# Patient Record
Sex: FEMALE | Race: BLACK OR AFRICAN AMERICAN | Employment: OTHER | URBAN - METROPOLITAN AREA
[De-identification: names, ages, dates, MRNs, and addresses within clinical notes are randomized per-mention and may not be internally consistent; named-entity substitution may affect disease eponyms.]

---

## 2018-11-26 ENCOUNTER — HOSPITAL ENCOUNTER (OUTPATIENT)
Dept: GENERAL RADIOLOGY | Age: 73
Discharge: HOME | End: 2018-11-28
Payer: COMMERCIAL

## 2018-11-26 DIAGNOSIS — Z13.9 VISIT FOR SCREENING: ICD-10-CM

## 2018-11-26 PROCEDURE — 77063 BREAST TOMOSYNTHESIS BI: CPT

## 2019-05-23 RX ORDER — ACETAMINOPHEN 325 MG/1
650 TABLET ORAL DAILY
COMMUNITY
Start: 2018-06-22 | End: 2020-08-27

## 2019-05-23 RX ORDER — ACETAMINOPHEN AND CODEINE PHOSPHATE 300; 30 MG/1; MG/1
1 TABLET ORAL EVERY 4 HOURS PRN
COMMUNITY
End: 2019-06-26 | Stop reason: ALTCHOICE

## 2019-05-24 ENCOUNTER — NURSE ONLY (OUTPATIENT)
Dept: PRIMARY CARE CLINIC | Age: 74
End: 2019-05-24
Payer: COMMERCIAL

## 2019-05-24 VITALS
TEMPERATURE: 97.3 F | OXYGEN SATURATION: 95 % | SYSTOLIC BLOOD PRESSURE: 148 MMHG | BODY MASS INDEX: 32.62 KG/M2 | HEIGHT: 67 IN | RESPIRATION RATE: 18 BRPM | WEIGHT: 207.8 LBS | HEART RATE: 74 BPM | DIASTOLIC BLOOD PRESSURE: 72 MMHG

## 2019-05-24 VITALS
HEIGHT: 67 IN | HEART RATE: 59 BPM | WEIGHT: 205 LBS | DIASTOLIC BLOOD PRESSURE: 62 MMHG | SYSTOLIC BLOOD PRESSURE: 134 MMHG | BODY MASS INDEX: 32.18 KG/M2 | OXYGEN SATURATION: 94 % | RESPIRATION RATE: 22 BRPM

## 2019-05-24 DIAGNOSIS — N18.30 CKD (CHRONIC KIDNEY DISEASE) STAGE 3, GFR 30-59 ML/MIN (HCC): Chronic | ICD-10-CM

## 2019-05-24 DIAGNOSIS — G47.00 INSOMNIA, UNSPECIFIED TYPE: ICD-10-CM

## 2019-05-24 DIAGNOSIS — N18.30 CONTROLLED TYPE 2 DIABETES MELLITUS WITH STAGE 3 CHRONIC KIDNEY DISEASE, WITHOUT LONG-TERM CURRENT USE OF INSULIN (HCC): ICD-10-CM

## 2019-05-24 DIAGNOSIS — E11.22 CONTROLLED TYPE 2 DIABETES MELLITUS WITH STAGE 3 CHRONIC KIDNEY DISEASE, WITHOUT LONG-TERM CURRENT USE OF INSULIN (HCC): ICD-10-CM

## 2019-05-24 DIAGNOSIS — Z91.81 PERSONAL HISTORY OF FALL: ICD-10-CM

## 2019-05-24 DIAGNOSIS — M15.9 GENERALIZED OSTEOARTHROSIS, INVOLVING MULTIPLE SITES: ICD-10-CM

## 2019-05-24 DIAGNOSIS — F01.50 VASCULAR DEMENTIA, UNCOMPLICATED (HCC): ICD-10-CM

## 2019-05-24 DIAGNOSIS — J44.9 OBSTRUCTIVE CHRONIC BRONCHITIS WITHOUT EXACERBATION (HCC): ICD-10-CM

## 2019-05-24 DIAGNOSIS — I10 ESSENTIAL HYPERTENSION: Primary | Chronic | ICD-10-CM

## 2019-05-24 DIAGNOSIS — R26.2 DIFFICULTY WALKING: ICD-10-CM

## 2019-05-24 DIAGNOSIS — K59.09 OTHER CONSTIPATION: ICD-10-CM

## 2019-05-24 RX ORDER — POLYETHYLENE GLYCOL 3350 17 G/17G
17 POWDER, FOR SOLUTION ORAL DAILY PRN
COMMUNITY
Start: 2015-08-15

## 2019-05-24 RX ORDER — POLYETHYLENE GLYCOL 400 AND PROPYLENE GLYCOL 4; 3 MG/ML; MG/ML
1 SOLUTION/ DROPS OPHTHALMIC 2 TIMES DAILY
Refills: 11 | COMMUNITY
Start: 2019-04-25 | End: 2020-06-01

## 2019-05-24 RX ORDER — GLIMEPIRIDE 1 MG/1
1 TABLET ORAL
COMMUNITY
End: 2019-06-20 | Stop reason: SDUPTHER

## 2019-05-24 RX ORDER — TRAZODONE HYDROCHLORIDE 50 MG/1
50 TABLET ORAL NIGHTLY
COMMUNITY
End: 2019-12-05 | Stop reason: SINTOL

## 2019-05-24 RX ORDER — LISINOPRIL AND HYDROCHLOROTHIAZIDE 20; 12.5 MG/1; MG/1
1 TABLET ORAL DAILY
COMMUNITY
End: 2019-06-20 | Stop reason: SDUPTHER

## 2019-05-24 RX ORDER — FLUTICASONE PROPIONATE 50 MCG
1 SPRAY, SUSPENSION (ML) NASAL DAILY
COMMUNITY
End: 2019-08-19

## 2019-05-24 RX ORDER — DIPHENOXYLATE HYDROCHLORIDE AND ATROPINE SULFATE 2.5; .025 MG/1; MG/1
1 TABLET ORAL DAILY
Refills: 11 | COMMUNITY
Start: 2019-05-05 | End: 2019-12-02 | Stop reason: SDUPTHER

## 2019-05-24 RX ORDER — LORATADINE 10 MG/1
10 TABLET ORAL DAILY
COMMUNITY
End: 2019-06-25 | Stop reason: SDUPTHER

## 2019-05-24 RX ORDER — LANOLIN ALCOHOL/MO/W.PET/CERES
3 CREAM (GRAM) TOPICAL DAILY
COMMUNITY
End: 2019-06-06

## 2019-05-24 NOTE — PROGRESS NOTES
medical problems: T2DM, HTN, COPD, OA, Depressive disorder, anxiety, dementia. Family medical history: Non-contributory. Preventative: Flu vac - 10/2018. Pneumovac - 10/2014. Last eye exam - 2012. Last dental exam - 2009. Social history: . No children. Jewish. 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 Completed 8th grade. DNR-CC. PHYSICAL EXAM:      Vitals:    05/24/19 0959   BP: 134/62   Site: Left Wrist   Position: Sitting   Pulse: 59   Resp: 22   SpO2: 94%   Weight: 205 lb (93 kg)   Height: 5' 7\" (1.702 m)        GEN:  elderly  WDWN female patient seated on bed in NAD. HEAD:  atraumatic, normocephalic. EYES:  EOMI, PERRL, no cataracts, conjunctivae appear normal.  ENT:   Good hearing, EACs without wax, TM's normal, nasal septum midline, no significant congestion, oral cavity without lesions  edentulous no dentures. ABD:  Well healed scar. Non tender to palp, no palp masses or HSM, normal BS. LUNGS: Clear to ausc., no wheezes, no rhonchi or Rales. HEART:  RRR Bradycardic, no murmurs, or gallops  BACK:  no scoliosis or kyphosis, non-tender to palp. EXTREMITIES: No pedal edema, no ulcerations, varicosities or erythema. No gross deformities. MUSCULOSKELETAL: Decreased ROM of knee joints due to pain and stiffness. Otherwise, joints are non-tender with full ROM. SKIN: No ulcerations or breakdown, rash, ecchymosis, or other lesions. NEURO:   No tremor, motor UEs 5/5 LEs  5/5, sensory normal, able to stand with extra effort, walks very slowly with mild-mod ataxia. PSYCH:  Pleasant and cooperative. Fluid  slow speech, oriented to person only. No delusional statements. Remembered 2/3 items. Medications reviewed.   Labs: 4/1/19 Urine Culture Final/ mixed 10,000  1/24/19 GFR 59, lytes-nl, A1C 6.1  12/5/18 Mammogram/Neg 11/5/18 UA CS/No growth 9/6/18 GFR 54 BUN/cre 22/1.0  Mean   8/15/18 UA CS/normal martha 10,000  7/30/18 UA CS/mixed martha 10,000  7/6/18 GFR 66, lytes-nl, A1c 5.9     ASSESSMENT:  Elderly female with has CKD (chronic kidney disease) stage 3, GFR 30-59 ml/min ([facility]); Essential hypertension; DM (diabetes mellitus) type II controlled with renal manifestation ([facility] Insomnia, unspecified; Generalized osteoarthrosis, involving multiple sites; Obstructive chronic bronchitis without exacerbation ([facility] [address] Personal history of fall; Vascular dementia, uncomplicated; Other constipation; and Difficulty walking on their problem list.     Diagnoses attached to this encounter:  Pablo Bower was seen today for pain. Diagnoses and all orders for this visit:    Essential hypertension    Vascular dementia, uncomplicated    Controlled type 2 diabetes mellitus with stage 3 chronic kidney disease, without long-term current use of insulin ([facility])    CKD (chronic kidney disease) stage 3, GFR 30-59 ml/min ([facility])         PLAN:  Continue Metformin and Amaryl for DM. Continue Voltaren gel, Tylenol #3 TID and Biofreeze for leg pain. Next A1c and BMP every 6 months, in June. Continue other meds as per Rx List.  Recheck 1 month. 40 minutes spent on visit, 25 minutes involved education/counseling regarding cardiac, DJD, T2DM and HTN disease processes, treatment options, meds and coordination of care with group home and pharmacy.    Current Outpatient Medications   Medication Sig Dispense Refill    diclofenac sodium 1 % GEL Apply 2 g topically 2 times daily Apply 2 gms topically to bilat knees QID for pain      fluticasone (FLONASE) 50 MCG/ACT nasal spray 1 spray by Each Nostril route daily      glimepiride (AMARYL) 1 MG tablet Take 1 mg by mouth every morning (before breakfast)      lisinopril-hydrochlorothiazide (PRINZIDE;ZESTORETIC) 20-12.5 MG per tablet Take 1 tablet by mouth daily      loratadine (CLARITIN) 10 MG tablet Take 10 mg by mouth daily      melatonin 3 MG TABS tablet Take 3 mg by mouth daily      metFORMIN (GLUCOPHAGE) 500 MG tablet Take 500 mg by mouth 2 times daily (with meals)      polyethylene glycol (GLYCOLAX) powder Take 17 g by mouth daily Apply 2 gms topically to bilat knees QID for pain      traZODone (DESYREL) 50 MG tablet Take 50 mg by mouth nightly      SYSTANE 0.4-0.3 % ophthalmic solution Place 1 drop into both eyes 4 times daily  11    Multiple Vitamin (MULTI-VITAMINS) TABS Take 1 tablet by mouth daily  11    acetaminophen (TYLENOL) 325 MG tablet Take 650 mg by mouth every 6 hours as needed for Pain      acetaminophen-codeine (TYLENOL #3) 300-30 MG per tablet Take 1 tablet by mouth every 4 hours as needed for Pain.  tiotropium (SPIRIVA HANDIHALER) 18 MCG inhalation capsule Inhale 1 capsule into the lungs daily 30 capsule 0    hydrALAZINE (APRESOLINE) 100 MG tablet Take 1 tablet by mouth 3 times daily 90 tablet 0    docusate sodium (COLACE) 100 MG capsule Take 100 mg by mouth daily      amLODIPine (NORVASC) 10 MG tablet Take 10 mg by mouth daily      PARoxetine (PAXIL) 20 MG tablet Take 20 mg by mouth every morning       aspirin 81 MG EC tablet Take 81 mg by mouth daily      albuterol (PROVENTIL HFA;VENTOLIN HFA) 108 (90 BASE) MCG/ACT inhaler Inhale 2 puffs into the lungs 3 times daily      vitamin D (ERGOCALCIFEROL) 01589 UNITS CAPS capsule Take 50,000 Units by mouth once a week Saturdays       No current facility-administered medications for this visit.

## 2019-06-10 RX ORDER — ERGOCALCIFEROL 1.25 MG/1
CAPSULE ORAL
Qty: 2 CAPSULE | Refills: 11 | Status: SHIPPED
Start: 2019-06-10 | End: 2020-05-04

## 2019-06-12 RX ORDER — POLYVINYL ALCOHOL 14 MG/ML
SOLUTION/ DROPS OPHTHALMIC
Qty: 15 ML | Refills: 11 | Status: SHIPPED | OUTPATIENT
Start: 2019-06-12 | End: 2019-08-30

## 2019-06-20 RX ORDER — DOCUSATE SODIUM 100 MG/1
CAPSULE, LIQUID FILLED ORAL
Qty: 62 CAPSULE | Refills: 10 | Status: SHIPPED
Start: 2019-06-20 | End: 2020-05-08

## 2019-06-20 RX ORDER — PAROXETINE HYDROCHLORIDE 20 MG/1
TABLET, FILM COATED ORAL
Qty: 31 TABLET | Refills: 10 | Status: SHIPPED | OUTPATIENT
Start: 2019-06-20 | End: 2019-10-16 | Stop reason: DRUGHIGH

## 2019-06-20 RX ORDER — LISINOPRIL AND HYDROCHLOROTHIAZIDE 20; 12.5 MG/1; MG/1
TABLET ORAL
Qty: 60 TABLET | Refills: 10 | Status: SHIPPED
Start: 2019-06-20 | End: 2020-04-13

## 2019-06-20 RX ORDER — GLIMEPIRIDE 1 MG/1
TABLET ORAL
Qty: 30 TABLET | Refills: 10 | Status: SHIPPED | OUTPATIENT
Start: 2019-06-20 | End: 2019-12-06

## 2019-06-26 ENCOUNTER — OFFICE VISIT (OUTPATIENT)
Dept: PRIMARY CARE CLINIC | Age: 74
End: 2019-06-26
Payer: COMMERCIAL

## 2019-06-26 VITALS
RESPIRATION RATE: 18 BRPM | HEART RATE: 64 BPM | OXYGEN SATURATION: 96 % | DIASTOLIC BLOOD PRESSURE: 63 MMHG | BODY MASS INDEX: 33.06 KG/M2 | TEMPERATURE: 98.1 F | HEIGHT: 67 IN | WEIGHT: 210.6 LBS | SYSTOLIC BLOOD PRESSURE: 116 MMHG

## 2019-06-26 DIAGNOSIS — I10 ESSENTIAL HYPERTENSION: Chronic | ICD-10-CM

## 2019-06-26 DIAGNOSIS — F01.50 VASCULAR DEMENTIA, UNCOMPLICATED (HCC): Primary | ICD-10-CM

## 2019-06-26 DIAGNOSIS — E11.22 CONTROLLED TYPE 2 DIABETES MELLITUS WITH STAGE 3 CHRONIC KIDNEY DISEASE, WITHOUT LONG-TERM CURRENT USE OF INSULIN (HCC): ICD-10-CM

## 2019-06-26 DIAGNOSIS — M15.9 GENERALIZED OSTEOARTHROSIS, INVOLVING MULTIPLE SITES: ICD-10-CM

## 2019-06-26 DIAGNOSIS — N18.30 CONTROLLED TYPE 2 DIABETES MELLITUS WITH STAGE 3 CHRONIC KIDNEY DISEASE, WITHOUT LONG-TERM CURRENT USE OF INSULIN (HCC): ICD-10-CM

## 2019-06-26 NOTE — PROGRESS NOTES
Ongoing medical problems: T2DM, HTN, COPD, OA, Depressive disorder, anxiety, dementia. Family medical history: Non-contributory. Preventative: Flu vac - 10/2018. Pneumovac - 10/2014. Last eye exam - 2012. Last dental exam - 2009. Social history: . 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 Completed 8th grade. DNR-CC. PHYSICAL EXAM:      Vitals:    06/26/19 0950   BP: 116/63   Site: Left Wrist   Position: Sitting   Pulse: 64   Resp: 18   Temp: 98.1 °F (36.7 °C)   SpO2: 96%   Weight: 210 lb 9.6 oz (95.5 kg)   Height: 5' 7\" (1.702 m)      GEN:  elderly  WDWN female patient seated on bed in [facility]. HEAD:  atraumatic, normocephalic. EYES:  EOMI, PERRL, no cataracts, conjunctivae appear normal.  ENT:   Good hearing, EACs without wax, TM's normal, nasal septum midline, no significant congestion, oral cavity without lesions  edentulous no dentures. ABD:  Well healed scar. Non tender to palp, no palp masses or HSM, normal BS. LUNGS: Clear to ausc., no wheezes, no rhonchi or Rales. HEART:  RRR Bradycardic, no murmurs, or gallops  BACK:  no scoliosis or kyphosis, non-tender to palp. EXTREMITIES: No pedal edema, no ulcerations, varicosities or erythema. No gross deformities. MUSCULOSKELETAL: Decreased ROM of knee joints due to pain and stiffness. Otherwise, joints are non-tender with full ROM. SKIN: No ulcerations or breakdown, rash, ecchymosis, or other lesions. NEURO:   No tremor, motor UEs 5/5 LEs  5/5, sensory normal, able to stand with extra effort, walks very slowly with mild-mod ataxia. PSYCH:  Pleasant and cooperative. Fluid  slow speech, oriented to person only. No delusional statements. Remembered 2/3 items. Medications reviewed.   Labs: 4/1/19 Urine Culture Final/ mixed 10,000  1/24/19 GFR 59, lytes-nl, A1C 6.1  12/5/18 Mammogram/Neg 11/5/18 UA CS/No growth 9/6/18 GFR 54 BUN/cre 22/1.0  Mean   8/15/18 UA CS/normal martha 10,000 7/30/18 UA CS/mixed martha 10,000  7/6/18 GFR 66, lytes-nl, A1c 5.9     ASSESSMENT:  Elderly female with has CKD (chronic kidney disease) stage 3, GFR 30-59 ml/min ([facility]); Essential hypertension; DM (diabetes mellitus) type II controlled with renal manifestation ([facility] Insomnia, unspecified; Generalized osteoarthrosis, involving multiple sites; Obstructive chronic bronchitis without exacerbation ([facility] [address] Personal history of fall; Vascular dementia, uncomplicated; Other constipation; and Difficulty walking on their problem list.     Diagnoses attached to this encounter:  Namrata Meyers was seen today for pain and diarrhea. Diagnoses and all orders for this visit:    Vascular dementia, uncomplicated    Essential hypertension  -     CBC Auto Differential; Future  -     Comprehensive Metabolic Panel; Future  -     TSH without Reflex; Future    Controlled type 2 diabetes mellitus with stage 3 chronic kidney disease, without long-term current use of insulin ([facility])  -     Hemoglobin A1C; Future    Generalized osteoarthrosis, involving multiple sites  -     Uric Acid; Future         PLAN:  Check CBC, CMP, TSH, A1c, uric acid. Continue Metformin and Amaryl for DM. Increase Voltaren gel to 4gms TID to knees. Discontinue Tylenol #3. May also use Biofreeze for leg pain. Next A1c and BMP every 6 months, in Sept.  Continue other meds as per Rx List.  Recheck 1 month. 40 minutes spent on visit, 25 minutes involved education/counseling regarding cardiac, DJD, T2DM and HTN disease processes, treatment options, meds and coordination of care with skilled nursing and pharmacy.    Current Outpatient Medications   Medication Sig Dispense Refill    loratadine (CLARITIN) 10 MG tablet TAKE ONE(1) TABLET BY MOUTH ONCE DAILY 31 tablet 11    glimepiride (AMARYL) 1 MG tablet TAKE ONE(1) TABLET BY MOUTH ONCE DAILY FOR DM 30 tablet 10    lisinopril-hydrochlorothiazide (PRINZIDE;ZESTORETIC) 20-12.5 MG per tablet TAKE 1 TABLET BY MOUTH TWICE DAILY 60 tablet 10    metFORMIN (GLUCOPHAGE) 500 MG tablet TAKE 1 TABLET BY MOUTH TWICE DAILY 62 tablet 10     MG capsule TAKE 1 CAPSULE BY MOUTH TWICE DAILY 62 capsule 10    PARoxetine (PAXIL) 20 MG tablet TAKE ONE(1) TABLET BY MOUTH ONCE DAILY FOR DEPRESSION 31 tablet 10    ARTIFICIAL TEARS 1.4 % ophthalmic solution INSTILL 2 DROPS IN EACH EYE THREE(3) TIMES DAILY **RE-ORDER ACCORDINGLY** 15 mL 11    vitamin D (ERGOCALCIFEROL) 20000 units CAPS capsule TAKE 1 CAPSULE BY MOUTH ONCE EVERY OTHER WEEK ON SATURDAY **RE-ORDER ACCORDINGLY** 2 capsule 11    diclofenac sodium 1 % GEL Apply 4 g topically 3 times daily      fluticasone (FLONASE) 50 MCG/ACT nasal spray 1 spray by Each Nostril route daily      polyethylene glycol (GLYCOLAX) powder Take 17 g by mouth daily Apply 2 gms topically to bilat knees QID for pain      traZODone (DESYREL) 50 MG tablet Take 50 mg by mouth nightly      SYSTANE 0.4-0.3 % ophthalmic solution Place 1 drop into both eyes 4 times daily  11    Multiple Vitamin (MULTI-VITAMINS) TABS Take 1 tablet by mouth daily  11    acetaminophen (TYLENOL) 325 MG tablet Take 650 mg by mouth every 6 hours as needed for Pain      tiotropium (SPIRIVA HANDIHALER) 18 MCG inhalation capsule Inhale 1 capsule into the lungs daily 30 capsule 0    hydrALAZINE (APRESOLINE) 100 MG tablet Take 1 tablet by mouth 3 times daily 90 tablet 0    amLODIPine (NORVASC) 10 MG tablet Take 10 mg by mouth daily      aspirin 81 MG EC tablet Take 81 mg by mouth daily      albuterol (PROVENTIL HFA;VENTOLIN HFA) 108 (90 BASE) MCG/ACT inhaler Inhale 2 puffs into the lungs 3 times daily       No current facility-administered medications for this visit.

## 2019-06-27 DIAGNOSIS — M15.9 GENERALIZED OSTEOARTHROSIS, INVOLVING MULTIPLE SITES: ICD-10-CM

## 2019-06-28 DIAGNOSIS — N18.30 CONTROLLED TYPE 2 DIABETES MELLITUS WITH STAGE 3 CHRONIC KIDNEY DISEASE, WITHOUT LONG-TERM CURRENT USE OF INSULIN (HCC): ICD-10-CM

## 2019-06-28 DIAGNOSIS — I10 ESSENTIAL HYPERTENSION: Chronic | ICD-10-CM

## 2019-06-28 DIAGNOSIS — E11.22 CONTROLLED TYPE 2 DIABETES MELLITUS WITH STAGE 3 CHRONIC KIDNEY DISEASE, WITHOUT LONG-TERM CURRENT USE OF INSULIN (HCC): ICD-10-CM

## 2019-07-30 ENCOUNTER — OFFICE VISIT (OUTPATIENT)
Dept: PRIMARY CARE CLINIC | Age: 74
End: 2019-07-30
Payer: COMMERCIAL

## 2019-07-30 VITALS
DIASTOLIC BLOOD PRESSURE: 65 MMHG | SYSTOLIC BLOOD PRESSURE: 127 MMHG | HEART RATE: 62 BPM | BODY MASS INDEX: 32.33 KG/M2 | RESPIRATION RATE: 18 BRPM | OXYGEN SATURATION: 98 % | HEIGHT: 67 IN | WEIGHT: 206 LBS

## 2019-07-30 DIAGNOSIS — E11.22 CONTROLLED TYPE 2 DIABETES MELLITUS WITH STAGE 3 CHRONIC KIDNEY DISEASE, WITHOUT LONG-TERM CURRENT USE OF INSULIN (HCC): ICD-10-CM

## 2019-07-30 DIAGNOSIS — I10 ESSENTIAL HYPERTENSION: Primary | Chronic | ICD-10-CM

## 2019-07-30 DIAGNOSIS — R26.2 DIFFICULTY WALKING: ICD-10-CM

## 2019-07-30 DIAGNOSIS — N18.30 CONTROLLED TYPE 2 DIABETES MELLITUS WITH STAGE 3 CHRONIC KIDNEY DISEASE, WITHOUT LONG-TERM CURRENT USE OF INSULIN (HCC): ICD-10-CM

## 2019-07-30 DIAGNOSIS — F01.50 VASCULAR DEMENTIA, UNCOMPLICATED (HCC): ICD-10-CM

## 2019-07-30 DIAGNOSIS — M15.9 GENERALIZED OSTEOARTHROSIS, INVOLVING MULTIPLE SITES: ICD-10-CM

## 2019-07-30 NOTE — PROGRESS NOTES
Ongoing evaluation of chronic medical problems. This patient has CKD (chronic kidney disease) stage 3, GFR 30-59 ml/min ([facility] [address] Essential hypertension; DM (diabetes mellitus) type II controlled with renal manifestation ([facility] [address] Insomnia, unspecified; Generalized osteoarthrosis, involving multiple sites; Obstructive chronic bronchitis without exacerbation ([facility] [address] Personal history of fall; Vascular dementia, uncomplicated; Other constipation; and Difficulty walking on their problem list.     Home visit medically necessary in lieu of an office visit due to: [facility] resident, dementia, uses walker, difficult to get out. HPI: She c/o her knees still hurt when she walks too much. Has been walking in [place] with walker back and forth to meals. Her breathing has been good. Denies CP or SOB. No swelling of LEs. Moving bowels most every day. Sleeping well on trazodone. REVIEW OF SYSTEMS:    GENERAL: Appetite good. No weight change, generally healthy, no change in strength or exercise tolerance. CARDIOVASCULAR: No edema, no chest pains, no murmurs, no palpitations, no syncope, no orthopnea. RESPIRATORY: No shortness of breath, no pain with breathing, no wheezing. OCCAS COUGH     GASTROINTESTINAL: No change in appetite, no dysphagia, no heartburn, no constipation or diarrhea, no bowel habit changes, no emesis, no melena, no hemorrhoids. LLQ ABD PAIN GENITOURINARY: No incontinence or retention, no urinary urgency, no nocturia, no frequent UTIs, no dysuria, no change in nature of urine. MUSCULOSKELETAL: No swelling or redness of joints, no limitation of range of motion, no weakness or numbness. OCCAS PAINS IN KNEES/LEGS. NEURO/PSYCH: No weakness, no tremor, no seizures,  no ataxia. No changes in sleep habits, no changes in thought content. CONFUSION AT TIMES. DEPRESSION/ANXIETY. All other systems negative. PAST MEDICAL HISTORY:  (Major events, hospitalizations, surgeries):  Appendectomy. Known allergies: NKA.

## 2019-08-19 RX ORDER — FLUTICASONE PROPIONATE 50 MCG
SPRAY, SUSPENSION (ML) NASAL
Qty: 16 G | Refills: 11 | Status: SHIPPED
Start: 2019-08-19 | End: 2020-10-19

## 2019-08-30 ENCOUNTER — OFFICE VISIT (OUTPATIENT)
Dept: PRIMARY CARE CLINIC | Age: 74
End: 2019-08-30
Payer: COMMERCIAL

## 2019-08-30 VITALS
SYSTOLIC BLOOD PRESSURE: 163 MMHG | WEIGHT: 204 LBS | TEMPERATURE: 97.6 F | RESPIRATION RATE: 20 BRPM | HEART RATE: 62 BPM | OXYGEN SATURATION: 97 % | HEIGHT: 67 IN | DIASTOLIC BLOOD PRESSURE: 79 MMHG | BODY MASS INDEX: 32.02 KG/M2

## 2019-08-30 DIAGNOSIS — I10 ESSENTIAL HYPERTENSION: Primary | Chronic | ICD-10-CM

## 2019-08-30 DIAGNOSIS — E11.22 CONTROLLED TYPE 2 DIABETES MELLITUS WITH STAGE 3 CHRONIC KIDNEY DISEASE, WITHOUT LONG-TERM CURRENT USE OF INSULIN (HCC): ICD-10-CM

## 2019-08-30 DIAGNOSIS — M15.9 GENERALIZED OSTEOARTHROSIS, INVOLVING MULTIPLE SITES: ICD-10-CM

## 2019-08-30 DIAGNOSIS — R26.2 DIFFICULTY WALKING: ICD-10-CM

## 2019-08-30 DIAGNOSIS — N18.30 CONTROLLED TYPE 2 DIABETES MELLITUS WITH STAGE 3 CHRONIC KIDNEY DISEASE, WITHOUT LONG-TERM CURRENT USE OF INSULIN (HCC): ICD-10-CM

## 2019-08-30 DIAGNOSIS — N18.30 CKD (CHRONIC KIDNEY DISEASE) STAGE 3, GFR 30-59 ML/MIN (HCC): Chronic | ICD-10-CM

## 2019-08-30 RX ORDER — NICOTINE POLACRILEX 4 MG
15 LOZENGE BUCCAL
COMMUNITY
Start: 2018-09-18

## 2019-09-03 LAB
ALBUMIN SERPL-MCNC: 3.8 G/DL
ALP BLD-CCNC: 77 U/L
ALT SERPL-CCNC: 12 U/L
ANION GAP SERPL CALCULATED.3IONS-SCNC: ABNORMAL MMOL/L
AST SERPL-CCNC: 13 U/L
AVERAGE GLUCOSE: 128
BASOPHILS ABSOLUTE: ABNORMAL /ΜL
BASOPHILS RELATIVE PERCENT: ABNORMAL %
BILIRUB SERPL-MCNC: 0.3 MG/DL (ref 0.1–1.4)
BUN BLDV-MCNC: 34 MG/DL
CALCIUM SERPL-MCNC: 9.5 MG/DL
CHLORIDE BLD-SCNC: 109 MMOL/L
CO2: 28 MMOL/L
CREAT SERPL-MCNC: 1.4 MG/DL
EOSINOPHILS ABSOLUTE: ABNORMAL /ΜL
EOSINOPHILS RELATIVE PERCENT: ABNORMAL %
GFR CALCULATED: 37
GLUCOSE BLD-MCNC: 84 MG/DL
HBA1C MFR BLD: 6.1 %
HCT VFR BLD CALC: 33.7 % (ref 36–46)
HEMOGLOBIN: 10.7 G/DL (ref 12–16)
LYMPHOCYTES ABSOLUTE: ABNORMAL /ΜL
LYMPHOCYTES RELATIVE PERCENT: ABNORMAL %
MCH RBC QN AUTO: 25.1 PG
MCHC RBC AUTO-ENTMCNC: 31.8 G/DL
MCV RBC AUTO: 78.9 FL
MONOCYTES ABSOLUTE: ABNORMAL /ΜL
MONOCYTES RELATIVE PERCENT: ABNORMAL %
NEUTROPHILS ABSOLUTE: ABNORMAL /ΜL
NEUTROPHILS RELATIVE PERCENT: ABNORMAL %
PLATELET # BLD: 230 K/ΜL
PMV BLD AUTO: 10.1 FL
POTASSIUM SERPL-SCNC: 4.5 MMOL/L
RBC # BLD: 4.27 10^6/ΜL
SODIUM BLD-SCNC: 145 MMOL/L
T4 FREE: 1.29
TOTAL PROTEIN: 6.1
TSH SERPL DL<=0.05 MIU/L-ACNC: 0 UIU/ML
WBC # BLD: 4.4 10^3/ML

## 2019-09-23 DIAGNOSIS — N18.30 CKD (CHRONIC KIDNEY DISEASE) STAGE 3, GFR 30-59 ML/MIN (HCC): Chronic | ICD-10-CM

## 2019-09-23 DIAGNOSIS — M15.9 GENERALIZED OSTEOARTHROSIS, INVOLVING MULTIPLE SITES: ICD-10-CM

## 2019-09-23 DIAGNOSIS — E11.22 CONTROLLED TYPE 2 DIABETES MELLITUS WITH STAGE 3 CHRONIC KIDNEY DISEASE, WITHOUT LONG-TERM CURRENT USE OF INSULIN (HCC): ICD-10-CM

## 2019-09-23 DIAGNOSIS — N18.30 CONTROLLED TYPE 2 DIABETES MELLITUS WITH STAGE 3 CHRONIC KIDNEY DISEASE, WITHOUT LONG-TERM CURRENT USE OF INSULIN (HCC): ICD-10-CM

## 2019-09-23 DIAGNOSIS — I10 ESSENTIAL HYPERTENSION: Chronic | ICD-10-CM

## 2019-09-26 ENCOUNTER — OFFICE VISIT (OUTPATIENT)
Dept: PRIMARY CARE CLINIC | Age: 74
End: 2019-09-26
Payer: COMMERCIAL

## 2019-09-26 VITALS
OXYGEN SATURATION: 97 % | RESPIRATION RATE: 20 BRPM | BODY MASS INDEX: 32.33 KG/M2 | HEIGHT: 67 IN | SYSTOLIC BLOOD PRESSURE: 155 MMHG | WEIGHT: 206 LBS | TEMPERATURE: 98.2 F | DIASTOLIC BLOOD PRESSURE: 79 MMHG | HEART RATE: 77 BPM

## 2019-09-26 DIAGNOSIS — R45.1 AGITATION: ICD-10-CM

## 2019-09-26 DIAGNOSIS — E11.22 CONTROLLED TYPE 2 DIABETES MELLITUS WITH STAGE 3 CHRONIC KIDNEY DISEASE, WITHOUT LONG-TERM CURRENT USE OF INSULIN (HCC): ICD-10-CM

## 2019-09-26 DIAGNOSIS — R53.83 OTHER FATIGUE: ICD-10-CM

## 2019-09-26 DIAGNOSIS — R26.2 DIFFICULTY WALKING: ICD-10-CM

## 2019-09-26 DIAGNOSIS — F01.50 VASCULAR DEMENTIA, UNCOMPLICATED (HCC): ICD-10-CM

## 2019-09-26 DIAGNOSIS — I10 ESSENTIAL HYPERTENSION: Primary | Chronic | ICD-10-CM

## 2019-09-26 DIAGNOSIS — Z91.81 PERSONAL HISTORY OF FALL: ICD-10-CM

## 2019-09-26 DIAGNOSIS — N18.30 CONTROLLED TYPE 2 DIABETES MELLITUS WITH STAGE 3 CHRONIC KIDNEY DISEASE, WITHOUT LONG-TERM CURRENT USE OF INSULIN (HCC): ICD-10-CM

## 2019-09-26 PROBLEM — F41.9 ANXIETY: Status: ACTIVE | Noted: 2019-09-26

## 2019-09-26 LAB
BILIRUBIN, URINE: NEGATIVE
BLOOD, URINE: NEGATIVE
CLARITY: CLEAR
COLOR: YELLOW
GLUCOSE URINE: NEGATIVE
KETONES, URINE: NEGATIVE
LEUKOCYTE ESTERASE, URINE: NEGATIVE
NITRITE, URINE: NEGATIVE
PH UA: 5 (ref 4.5–8)
PROTEIN UA: ABNORMAL
SPECIFIC GRAVITY, URINE: 1.02
UROBILINOGEN, URINE: NORMAL

## 2019-09-26 PROCEDURE — 1123F ACP DISCUSS/DSCN MKR DOCD: CPT | Performed by: PHYSICIAN ASSISTANT

## 2019-09-26 PROCEDURE — 1090F PRES/ABSN URINE INCON ASSESS: CPT | Performed by: PHYSICIAN ASSISTANT

## 2019-09-26 PROCEDURE — G8417 CALC BMI ABV UP PARAM F/U: HCPCS | Performed by: PHYSICIAN ASSISTANT

## 2019-09-26 PROCEDURE — 3044F HG A1C LEVEL LT 7.0%: CPT | Performed by: PHYSICIAN ASSISTANT

## 2019-09-26 NOTE — PROGRESS NOTES
pain. Check A1c and BMP every 6 months, next in March. Continue other meds as per Rx List.  Recheck 1 month. 40 minutes spent on visit, 25 minutes involved education/counseling regarding cardiac, DJD, T2DM and HTN disease processes, treatment options, meds and coordination of care with [facility] and pharmacy.      Current Outpatient Medications   Medication Sig Dispense Refill    ONE TOUCH ULTRA TEST strip USE AS DIRECTED FOR TESTING BLOOD SUGARS DAILY  15    glucose (GLUTOSE) 40 % GEL Take 15 g by mouth Use as directed      glucagon, rDNA, (GLUCAGEN HYPOKIT) 1 MG SOLR injection Use 1mg IM for glucose below 60 and recheck every 15 minutes      fluticasone (FLONASE) 50 MCG/ACT nasal spray USE 1 SPRAY IN EACH NOSTRIL TWICE DAILY FOR SEASONAL ALLERGIES *SHAKE GENTLY* 16 g 11    diclofenac sodium 1 % GEL APPLY TOPICALLY TO UPPER JOINT (KNEES) THREE(3) TIMES DAILY 100 g 11    loratadine (CLARITIN) 10 MG tablet TAKE ONE(1) TABLET BY MOUTH ONCE DAILY 31 tablet 11    glimepiride (AMARYL) 1 MG tablet TAKE ONE(1) TABLET BY MOUTH ONCE DAILY FOR DM 30 tablet 10    lisinopril-hydrochlorothiazide (PRINZIDE;ZESTORETIC) 20-12.5 MG per tablet TAKE 1 TABLET BY MOUTH TWICE DAILY 60 tablet 10    metFORMIN (GLUCOPHAGE) 500 MG tablet TAKE 1 TABLET BY MOUTH TWICE DAILY 62 tablet 10     MG capsule TAKE 1 CAPSULE BY MOUTH TWICE DAILY 62 capsule 10    PARoxetine (PAXIL) 20 MG tablet TAKE ONE(1) TABLET BY MOUTH ONCE DAILY FOR DEPRESSION 31 tablet 10    vitamin D (ERGOCALCIFEROL) 81953 units CAPS capsule TAKE 1 CAPSULE BY MOUTH ONCE EVERY OTHER WEEK ON SATURDAY **RE-ORDER ACCORDINGLY** 2 capsule 11    polyethylene glycol (GLYCOLAX) powder Take 17 g by mouth daily as needed (constipation) Dissolve in 8 ounces of water      traZODone (DESYREL) 50 MG tablet Take 50 mg by mouth nightly      SYSTANE 0.4-0.3 % ophthalmic solution Place 1 drop into both eyes 2 times daily   11    Multiple Vitamin (MULTI-VITAMINS) TABS Take 1 tablet

## 2019-09-30 DIAGNOSIS — R45.1 AGITATION: ICD-10-CM

## 2019-09-30 PROCEDURE — 81003 URINALYSIS AUTO W/O SCOPE: CPT | Performed by: PHYSICIAN ASSISTANT

## 2019-10-03 DIAGNOSIS — R45.1 AGITATION: ICD-10-CM

## 2019-10-03 DIAGNOSIS — R53.83 OTHER FATIGUE: ICD-10-CM

## 2019-10-08 PROCEDURE — 90653 IIV ADJUVANT VACCINE IM: CPT | Performed by: FAMILY MEDICINE

## 2019-10-08 PROCEDURE — G0008 ADMIN INFLUENZA VIRUS VAC: HCPCS | Performed by: FAMILY MEDICINE

## 2019-10-09 ENCOUNTER — OFFICE VISIT (OUTPATIENT)
Dept: PRIMARY CARE CLINIC | Age: 74
End: 2019-10-09
Payer: COMMERCIAL

## 2019-10-09 VITALS
DIASTOLIC BLOOD PRESSURE: 72 MMHG | BODY MASS INDEX: 32.3 KG/M2 | SYSTOLIC BLOOD PRESSURE: 146 MMHG | TEMPERATURE: 97 F | OXYGEN SATURATION: 95 % | HEART RATE: 66 BPM | RESPIRATION RATE: 16 BRPM | WEIGHT: 205.8 LBS | HEIGHT: 67 IN

## 2019-10-09 DIAGNOSIS — Z00.00 ROUTINE GENERAL MEDICAL EXAMINATION AT A HEALTH CARE FACILITY: ICD-10-CM

## 2019-10-09 DIAGNOSIS — Z12.31 ENCOUNTER FOR SCREENING MAMMOGRAM FOR BREAST CANCER: Primary | ICD-10-CM

## 2019-10-09 PROCEDURE — G0438 PPPS, INITIAL VISIT: HCPCS | Performed by: PHYSICIAN ASSISTANT

## 2019-10-09 PROCEDURE — 1123F ACP DISCUSS/DSCN MKR DOCD: CPT | Performed by: PHYSICIAN ASSISTANT

## 2019-10-09 PROCEDURE — G8482 FLU IMMUNIZE ORDER/ADMIN: HCPCS | Performed by: PHYSICIAN ASSISTANT

## 2019-10-09 PROCEDURE — 4040F PNEUMOC VAC/ADMIN/RCVD: CPT | Performed by: PHYSICIAN ASSISTANT

## 2019-10-09 PROCEDURE — 3017F COLORECTAL CA SCREEN DOC REV: CPT | Performed by: PHYSICIAN ASSISTANT

## 2019-10-09 ASSESSMENT — PATIENT HEALTH QUESTIONNAIRE - PHQ9
SUM OF ALL RESPONSES TO PHQ QUESTIONS 1-9: 0
SUM OF ALL RESPONSES TO PHQ QUESTIONS 1-9: 0

## 2019-10-09 ASSESSMENT — LIFESTYLE VARIABLES: HOW OFTEN DO YOU HAVE A DRINK CONTAINING ALCOHOL: 0

## 2019-10-16 ENCOUNTER — TELEPHONE (OUTPATIENT)
Dept: PRIMARY CARE CLINIC | Age: 74
End: 2019-10-16

## 2019-10-17 RX ORDER — PAROXETINE 30 MG/1
TABLET, FILM COATED ORAL
Qty: 1 TABLET | Refills: 10 | Status: SHIPPED | OUTPATIENT
Start: 2019-10-17 | End: 2019-12-09

## 2019-10-22 RX ORDER — BLOOD-GLUCOSE CONTROL, NORMAL
EACH MISCELLANEOUS
Qty: 1 EACH | Refills: 11 | Status: SHIPPED | OUTPATIENT
Start: 2019-10-22

## 2019-10-22 RX ORDER — BLOOD-GLUCOSE CONTROL, HIGH
EACH MISCELLANEOUS
Qty: 1 EACH | Refills: 11 | Status: SHIPPED | OUTPATIENT
Start: 2019-10-22

## 2019-10-29 ENCOUNTER — OFFICE VISIT (OUTPATIENT)
Dept: PRIMARY CARE CLINIC | Age: 74
End: 2019-10-29
Payer: COMMERCIAL

## 2019-10-29 VITALS
HEIGHT: 67 IN | WEIGHT: 206 LBS | BODY MASS INDEX: 32.33 KG/M2 | HEART RATE: 77 BPM | OXYGEN SATURATION: 96 % | DIASTOLIC BLOOD PRESSURE: 72 MMHG | SYSTOLIC BLOOD PRESSURE: 136 MMHG | TEMPERATURE: 98.3 F | RESPIRATION RATE: 18 BRPM

## 2019-10-29 DIAGNOSIS — M15.9 GENERALIZED OSTEOARTHROSIS, INVOLVING MULTIPLE SITES: ICD-10-CM

## 2019-10-29 DIAGNOSIS — R45.1 AGITATION: ICD-10-CM

## 2019-10-29 DIAGNOSIS — N18.30 CKD (CHRONIC KIDNEY DISEASE) STAGE 3, GFR 30-59 ML/MIN (HCC): Chronic | ICD-10-CM

## 2019-10-29 DIAGNOSIS — I10 ESSENTIAL HYPERTENSION: Primary | Chronic | ICD-10-CM

## 2019-10-29 DIAGNOSIS — F01.50 VASCULAR DEMENTIA, UNCOMPLICATED (HCC): ICD-10-CM

## 2019-10-29 PROCEDURE — 1090F PRES/ABSN URINE INCON ASSESS: CPT | Performed by: PHYSICIAN ASSISTANT

## 2019-10-29 PROCEDURE — 1123F ACP DISCUSS/DSCN MKR DOCD: CPT | Performed by: PHYSICIAN ASSISTANT

## 2019-10-29 PROCEDURE — G8417 CALC BMI ABV UP PARAM F/U: HCPCS | Performed by: PHYSICIAN ASSISTANT

## 2019-10-29 PROCEDURE — G8482 FLU IMMUNIZE ORDER/ADMIN: HCPCS | Performed by: PHYSICIAN ASSISTANT

## 2019-11-06 ENCOUNTER — TELEPHONE (OUTPATIENT)
Dept: PRIMARY CARE CLINIC | Age: 74
End: 2019-11-06

## 2019-11-20 ENCOUNTER — TELEPHONE (OUTPATIENT)
Dept: PRIMARY CARE CLINIC | Age: 74
End: 2019-11-20

## 2019-11-26 ENCOUNTER — APPOINTMENT (OUTPATIENT)
Dept: GENERAL RADIOLOGY | Age: 74
End: 2019-11-26
Payer: COMMERCIAL

## 2019-11-26 ENCOUNTER — HOSPITAL ENCOUNTER (EMERGENCY)
Age: 74
Discharge: HOME | End: 2019-11-26
Attending: EMERGENCY MEDICINE
Payer: COMMERCIAL

## 2019-11-26 VITALS
TEMPERATURE: 98.7 F | HEART RATE: 81 BPM | RESPIRATION RATE: 16 BRPM | OXYGEN SATURATION: 96 % | SYSTOLIC BLOOD PRESSURE: 173 MMHG | DIASTOLIC BLOOD PRESSURE: 61 MMHG

## 2019-11-26 DIAGNOSIS — M19.90 OSTEOARTHRITIS, UNSPECIFIED OSTEOARTHRITIS TYPE, UNSPECIFIED SITE: ICD-10-CM
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
## 2019-11-26 DIAGNOSIS — M67.911 ROTATOR CUFF DISORDER, RIGHT: ICD-10-CM

## 2019-11-26 DIAGNOSIS — M67.912 ROTATOR CUFF DISORDER, LEFT: ICD-10-CM

## 2019-11-26 PROCEDURE — 73564 X-RAY EXAM KNEE 4 OR MORE: CPT

## 2019-11-26 PROCEDURE — 6370000000 HC RX 637 (ALT 250 FOR IP): Performed by: NURSE PRACTITIONER

## 2019-11-26 PROCEDURE — 73060 X-RAY EXAM OF HUMERUS: CPT

## 2019-11-26 PROCEDURE — 73610 X-RAY EXAM OF ANKLE: CPT

## 2019-11-26 PROCEDURE — 73030 X-RAY EXAM OF SHOULDER: CPT

## 2019-11-26 PROCEDURE — 99284 EMERGENCY DEPT VISIT MOD MDM: CPT

## 2019-11-26 RX ORDER — ACETAMINOPHEN 500 MG
1000 TABLET ORAL ONCE
Status: COMPLETED | OUTPATIENT
Start: 2019-11-26 | End: 2019-11-26

## 2019-11-26 RX ADMIN — ACETAMINOPHEN 1000 MG: 500 TABLET ORAL at 16:07

## 2019-11-26 ASSESSMENT — PAIN DESCRIPTION - DESCRIPTORS: DESCRIPTORS: ACHING

## 2019-11-26 ASSESSMENT — PAIN DESCRIPTION - ORIENTATION: ORIENTATION: LEFT

## 2019-11-26 ASSESSMENT — PAIN DESCRIPTION - FREQUENCY: FREQUENCY: CONTINUOUS

## 2019-11-26 ASSESSMENT — PAIN SCALES - GENERAL
PAINLEVEL_OUTOF10: 10
PAINLEVEL_OUTOF10: 10

## 2019-11-26 ASSESSMENT — PAIN DESCRIPTION - PROGRESSION: CLINICAL_PROGRESSION: NOT CHANGED

## 2019-11-26 ASSESSMENT — PAIN DESCRIPTION - LOCATION: LOCATION: KNEE

## 2019-11-26 ASSESSMENT — PAIN DESCRIPTION - PAIN TYPE: TYPE: ACUTE PAIN

## 2019-11-26 ASSESSMENT — PAIN - FUNCTIONAL ASSESSMENT: PAIN_FUNCTIONAL_ASSESSMENT: PREVENTS OR INTERFERES WITH MANY ACTIVE NOT PASSIVE ACTIVITIES

## 2019-12-02 ENCOUNTER — HOSPITAL ENCOUNTER (EMERGENCY)
Age: 74
Discharge: HOME | End: 2019-12-02
Attending: EMERGENCY MEDICINE
Payer: COMMERCIAL

## 2019-12-02 VITALS
TEMPERATURE: 98.4 F | SYSTOLIC BLOOD PRESSURE: 181 MMHG | OXYGEN SATURATION: 99 % | HEIGHT: 66 IN | WEIGHT: 210 LBS | DIASTOLIC BLOOD PRESSURE: 77 MMHG | HEART RATE: 89 BPM | RESPIRATION RATE: 20 BRPM | BODY MASS INDEX: 33.75 KG/M2

## 2019-12-02 DIAGNOSIS — E16.2 HYPOGLYCEMIA: Primary | ICD-10-CM

## 2019-12-02 LAB
METER GLUCOSE: 115 MG/DL (ref 74–99)
METER GLUCOSE: 210 MG/DL (ref 74–99)

## 2019-12-02 PROCEDURE — 99285 EMERGENCY DEPT VISIT HI MDM: CPT

## 2019-12-02 PROCEDURE — 82962 GLUCOSE BLOOD TEST: CPT

## 2019-12-02 RX ORDER — GLUCAGON HYDROCHLORIDE 1 MG
KIT INJECTION
Qty: 1 MG | Refills: 10 | Status: SHIPPED | OUTPATIENT
Start: 2019-12-02

## 2019-12-02 RX ORDER — DIPHENOXYLATE HYDROCHLORIDE AND ATROPINE SULFATE 2.5; .025 MG/1; MG/1
1 TABLET ORAL DAILY
Qty: 30 TABLET | Refills: 10 | Status: SHIPPED
Start: 2019-12-02 | End: 2020-09-15

## 2019-12-04 ENCOUNTER — TELEPHONE (OUTPATIENT)
Dept: PRIMARY CARE CLINIC | Age: 74
End: 2019-12-04

## 2019-12-04 DIAGNOSIS — N18.30 CKD (CHRONIC KIDNEY DISEASE) STAGE 3, GFR 30-59 ML/MIN (HCC): ICD-10-CM

## 2019-12-04 DIAGNOSIS — E11.22 CONTROLLED TYPE 2 DIABETES MELLITUS WITH STAGE 3 CHRONIC KIDNEY DISEASE, WITHOUT LONG-TERM CURRENT USE OF INSULIN (HCC): ICD-10-CM

## 2019-12-04 DIAGNOSIS — I10 ESSENTIAL HYPERTENSION: Primary | ICD-10-CM

## 2019-12-04 DIAGNOSIS — N18.30 CONTROLLED TYPE 2 DIABETES MELLITUS WITH STAGE 3 CHRONIC KIDNEY DISEASE, WITHOUT LONG-TERM CURRENT USE OF INSULIN (HCC): ICD-10-CM

## 2019-12-06 ENCOUNTER — TELEPHONE (OUTPATIENT)
Dept: PRIMARY CARE CLINIC | Age: 74
End: 2019-12-06

## 2019-12-06 DIAGNOSIS — I10 ESSENTIAL HYPERTENSION: ICD-10-CM

## 2019-12-06 DIAGNOSIS — E11.22 CONTROLLED TYPE 2 DIABETES MELLITUS WITH STAGE 3 CHRONIC KIDNEY DISEASE, WITHOUT LONG-TERM CURRENT USE OF INSULIN (HCC): ICD-10-CM

## 2019-12-06 DIAGNOSIS — N18.30 CONTROLLED TYPE 2 DIABETES MELLITUS WITH STAGE 3 CHRONIC KIDNEY DISEASE, WITHOUT LONG-TERM CURRENT USE OF INSULIN (HCC): ICD-10-CM

## 2019-12-06 LAB
AVERAGE GLUCOSE: 120
BASOPHILS ABSOLUTE: ABNORMAL
BASOPHILS RELATIVE PERCENT: ABNORMAL
EOSINOPHILS ABSOLUTE: ABNORMAL
EOSINOPHILS RELATIVE PERCENT: ABNORMAL
HBA1C MFR BLD: 5.8 %
HCT VFR BLD CALC: 33.1 % (ref 36–46)
HEMOGLOBIN: 10.3 G/DL (ref 12–16)
LYMPHOCYTES ABSOLUTE: ABNORMAL
LYMPHOCYTES RELATIVE PERCENT: ABNORMAL
MCH RBC QN AUTO: 25 PG
MCHC RBC AUTO-ENTMCNC: 31.2 G/DL
MCV RBC AUTO: 80.2 FL
MONOCYTES ABSOLUTE: ABNORMAL
MONOCYTES RELATIVE PERCENT: ABNORMAL
NEUTROPHILS ABSOLUTE: ABNORMAL
NEUTROPHILS RELATIVE PERCENT: ABNORMAL
PDW BLD-RTO: 9.6 %
PLATELET # BLD: 304 K/ΜL
PMV BLD AUTO: ABNORMAL FL
RBC # BLD: 4.13 10^6/ΜL
WBC # BLD: 6.6 10^3/ML

## 2019-12-06 RX ORDER — MIRTAZAPINE 7.5 MG/1
7.5 TABLET, FILM COATED ORAL NIGHTLY
Qty: 30 TABLET | Refills: 5 | Status: SHIPPED
Start: 2019-12-06 | End: 2020-03-16 | Stop reason: ALTCHOICE

## 2019-12-09 ENCOUNTER — OFFICE VISIT (OUTPATIENT)
Dept: PRIMARY CARE CLINIC | Age: 74
End: 2019-12-09
Payer: COMMERCIAL

## 2019-12-09 VITALS
SYSTOLIC BLOOD PRESSURE: 119 MMHG | HEART RATE: 78 BPM | DIASTOLIC BLOOD PRESSURE: 56 MMHG | WEIGHT: 193.8 LBS | TEMPERATURE: 97 F | RESPIRATION RATE: 20 BRPM | BODY MASS INDEX: 31.15 KG/M2 | OXYGEN SATURATION: 97 % | HEIGHT: 66 IN

## 2019-12-09 DIAGNOSIS — R26.2 DIFFICULTY WALKING: ICD-10-CM

## 2019-12-09 DIAGNOSIS — M15.9 GENERALIZED OSTEOARTHROSIS, INVOLVING MULTIPLE SITES: ICD-10-CM

## 2019-12-09 DIAGNOSIS — F01.50 VASCULAR DEMENTIA, UNCOMPLICATED (HCC): ICD-10-CM

## 2019-12-09 DIAGNOSIS — F41.9 ANXIETY: ICD-10-CM

## 2019-12-09 DIAGNOSIS — N18.30 CONTROLLED TYPE 2 DIABETES MELLITUS WITH STAGE 3 CHRONIC KIDNEY DISEASE, WITHOUT LONG-TERM CURRENT USE OF INSULIN (HCC): ICD-10-CM

## 2019-12-09 DIAGNOSIS — I10 ESSENTIAL HYPERTENSION: Primary | Chronic | ICD-10-CM

## 2019-12-09 DIAGNOSIS — E11.22 CONTROLLED TYPE 2 DIABETES MELLITUS WITH STAGE 3 CHRONIC KIDNEY DISEASE, WITHOUT LONG-TERM CURRENT USE OF INSULIN (HCC): ICD-10-CM

## 2019-12-09 PROCEDURE — G8417 CALC BMI ABV UP PARAM F/U: HCPCS | Performed by: PHYSICIAN ASSISTANT

## 2019-12-09 PROCEDURE — 1090F PRES/ABSN URINE INCON ASSESS: CPT | Performed by: PHYSICIAN ASSISTANT

## 2019-12-09 PROCEDURE — 1123F ACP DISCUSS/DSCN MKR DOCD: CPT | Performed by: PHYSICIAN ASSISTANT

## 2019-12-09 PROCEDURE — G8482 FLU IMMUNIZE ORDER/ADMIN: HCPCS | Performed by: PHYSICIAN ASSISTANT

## 2019-12-09 RX ORDER — PAROXETINE HYDROCHLORIDE 40 MG/1
40 TABLET, FILM COATED ORAL DAILY
Qty: 30 TABLET | Refills: 3 | Status: SHIPPED
Start: 2019-12-09 | End: 2020-03-31 | Stop reason: SDUPTHER

## 2019-12-11 RX ORDER — HYDRALAZINE HYDROCHLORIDE 100 MG/1
TABLET, FILM COATED ORAL
Qty: 93 TABLET | Refills: 10 | Status: SHIPPED
Start: 2019-12-11 | End: 2020-11-17

## 2019-12-15 ENCOUNTER — TELEPHONE (OUTPATIENT)
Dept: PRIMARY CARE CLINIC | Age: 74
End: 2019-12-15

## 2019-12-23 ENCOUNTER — HOSPITAL ENCOUNTER (OUTPATIENT)
Dept: GENERAL RADIOLOGY | Age: 74
Discharge: HOME | End: 2019-12-25
Payer: COMMERCIAL

## 2019-12-23 DIAGNOSIS — Z12.31 ENCOUNTER FOR SCREENING MAMMOGRAM FOR BREAST CANCER: ICD-10-CM

## 2019-12-23 PROCEDURE — 77063 BREAST TOMOSYNTHESIS BI: CPT

## 2019-12-30 RX ORDER — LANCETS 28 GAUGE
EACH MISCELLANEOUS
Qty: 100 EACH | Refills: 11 | Status: SHIPPED | OUTPATIENT
Start: 2019-12-30

## 2020-01-17 ENCOUNTER — OFFICE VISIT (OUTPATIENT)
Dept: PRIMARY CARE CLINIC | Age: 75
End: 2020-01-17
Payer: COMMERCIAL

## 2020-01-17 VITALS
HEART RATE: 78 BPM | OXYGEN SATURATION: 95 % | SYSTOLIC BLOOD PRESSURE: 121 MMHG | TEMPERATURE: 97.5 F | DIASTOLIC BLOOD PRESSURE: 53 MMHG | BODY MASS INDEX: 32.08 KG/M2 | HEIGHT: 66 IN | RESPIRATION RATE: 18 BRPM | WEIGHT: 199.6 LBS

## 2020-01-17 PROCEDURE — G8482 FLU IMMUNIZE ORDER/ADMIN: HCPCS | Performed by: PHYSICIAN ASSISTANT

## 2020-01-17 PROCEDURE — 1123F ACP DISCUSS/DSCN MKR DOCD: CPT | Performed by: PHYSICIAN ASSISTANT

## 2020-01-17 PROCEDURE — 1090F PRES/ABSN URINE INCON ASSESS: CPT | Performed by: PHYSICIAN ASSISTANT

## 2020-01-17 PROCEDURE — G8417 CALC BMI ABV UP PARAM F/U: HCPCS | Performed by: PHYSICIAN ASSISTANT

## 2020-01-17 NOTE — PROGRESS NOTES
Ongoing evaluation of chronic medical problems. This patient has CKD (chronic kidney disease) stage 3, GFR 30-59 ml/min ([facility] [address] Essential hypertension; DM (diabetes mellitus) type II controlled with renal manifestation ([facility] [address] Insomnia, unspecified; Generalized osteoarthrosis, involving multiple sites; Obstructive chronic bronchitis without exacerbation ([facility] [address] Personal history of fall; Vascular dementia, uncomplicated ([facility] [address] Other constipation; Difficulty walking; Agitation; and Anxiety on their problem list.     Home visit medically necessary in lieu of an office visit due to: [facility] resident, dementia, uses walker, difficult to get out. HPI:  Feeling better physically and mentally this month. Weight stabilizing and is up ~5 lbs. No falls/hypoglycemic episodes. Nursing states ST evaulated her - no dysphagia. On Remeron hs and drinks health shakes TID. More sociable. On 40 mg Paxil daily. Sleeping good. Remains off of Trazodone due to ^ sleepiness. Still receiving PT to increase functional ability and decrease falls. Still complains of chronic knee pain, treated by Dr. Jazmin Prabhakar. Denies CP or SOB. No swelling of LEs. Moving bowels most every day. No urinary symptoms. REVIEW OF SYSTEMS:    GENERAL: Appetite fair. Recent weight change, generally healthy, no change in strength or exercise tolerance. CARDIOVASCULAR: No edema, no chest pains, no murmurs, no palpitations, no syncope, no orthopnea. RESPIRATORY: No shortness of breath, no pain with breathing, no wheezing. OCCAS COUGH GASTROINTESTINAL: No change in appetite, no dysphagia, no heartburn, no constipation or diarrhea, no bowel habit changes, no emesis, no melena, no hemorrhoids. LLQ ABD PAIN GENITOURINARY: No incontinence or retention, no urinary urgency, no nocturia, no frequent UTIs, no dysuria, no change in nature of urine. MUSCULOSKELETAL: No swelling or redness of joints, no limitation of range of motion, no weakness or numbness.   OCCAS PAINS IN cooperative. Fluid  slow speech, oriented to person only. No delusional statements. Remembered 2/3 items. Medications reviewed. Labs: 12/23/19 Mammo neg  12/6/19  Glu 63, GFR 53, Prot 5.8, A1c 5.8, H/H 10.3/33.1, ALB 3.4, UA CX neg  9/29/19 UA cx neg  9/3/19 WBC 4.4, HH 10.7/33.7, A1c 6.1, TSH 0.002, Free T4 1.29, lytes nml, Bun 34, Creat 1.4, GFR 37, Glu 84, hepatic nml   6/27/19 HH 9.5/30, GFR 48, lytes nl, LFTs nl, TSH 0.003, A1c 6.5, uric acid 6.3 4/1/19 Urine Culture Final/ mixed 10,000  1/24/19 GFR 59, lytes-nl, A1C 6.1  12/5/18 Mammogram/Neg 11/5/18 UA CS/No growth 9/6/18 GFR 54 BUN/cre 22/1.0  Mean   7/6/18 GFR 66, lytes-nl, A1c 5.9     ASSESSMENT:  Elderly female with has CKD (chronic kidney disease) stage 3, GFR 30-59 ml/min ([address] Essential hypertension; DM (diabetes mellitus) type II controlled with renal manifestation ([address] Insomnia, unspecified; Generalized osteoarthrosis, involving multiple sites; Obstructive chronic bronchitis without exacerbation ([address] Personal history of fall; Vascular dementia, uncomplicated ([address] Other constipation; Difficulty walking; Agitation; and Anxiety on their problem list.       Samantha Moses was seen today for hypertension, dementia and diabetes mellitus. Diagnoses and all orders for this visit:    Essential hypertension    Vascular dementia, uncomplicated ([address]    Obstructive chronic bronchitis without exacerbation ([address]    Controlled type 2 diabetes mellitus with stage 3 chronic kidney disease, without long-term current use of insulin ([facility])    Generalized osteoarthrosis, involving multiple sites    CKD (chronic kidney disease) stage 3, GFR 30-59 ml/min ([facility])    Insomnia, unspecified type    Personal history of fall    Difficulty walking    Other constipation       PLAN:  Cont Paxil to 40 mg daily for anxiety. Watch for increased anxiety. Discussed diet and exercise. Continue Voltaren gel to 4gms TID to knees.  Follow up with Dr. Lavern Redding (ortho) on regular basis. May also use Biofreeze for leg pain. Check A1c and BMP every 6 months, next in March. Continue other meds as per Rx List.  Recheck 1 month. 40 minutes spent on visit, 25 minutes involved education/counseling regarding cardiac, DJD, T2DM and HTN disease processes, treatment options, meds and coordination of care with [facility] and pharmacy. Current Outpatient Medications   Medication Sig Dispense Refill    ASSURE COMFORT LANCETS 28G MISC CHECK GLUCOSE ONCE DAILY IN AM CALL  IF > 250 100 each 11    hydrALAZINE (APRESOLINE) 100 MG tablet TAKE 1 TABLET BY MOUTH THREE TIMES DAILY FOR HTN.  93 tablet 10    PARoxetine (PAXIL) 40 MG tablet Take 1 tablet by mouth daily 30 tablet 3    mirtazapine (REMERON) 7.5 MG tablet Take 1 tablet by mouth nightly to stimulate appetite 30 tablet 5    Multiple Vitamin (MULTI-VITAMINS) TABS TAKE 1 TABLET BY MOUTH DAILY 30 tablet 10    GLUCAGEN HYPOKIT 1 MG SOLR injection USE 1MG IM FOR GLUCOSE BELOW 60 AND RECHECK IN 15 MINUTES 1 mg 10    Blood Glucose Calibration (RIGHTEST CONTROL) High LIQD USE AS DIRECTED 1 each 11    Blood Glucose Calibration (OT ULTRA/FASTTK CNTRL SOLN) SOLN USE AS DIRECTED 1 each 11    ONE TOUCH ULTRA TEST strip USE AS DIRECTED FOR TESTING BLOOD SUGARS DAILY  15    glucose (GLUTOSE) 40 % GEL Take 15 g by mouth Use as directed      fluticasone (FLONASE) 50 MCG/ACT nasal spray USE 1 SPRAY IN EACH NOSTRIL TWICE DAILY FOR SEASONAL ALLERGIES *SHAKE GENTLY* 16 g 11    diclofenac sodium 1 % GEL APPLY TOPICALLY TO UPPER JOINT (KNEES) THREE(3) TIMES DAILY 100 g 11    loratadine (CLARITIN) 10 MG tablet TAKE ONE(1) TABLET BY MOUTH ONCE DAILY 31 tablet 11    lisinopril-hydrochlorothiazide (PRINZIDE;ZESTORETIC) 20-12.5 MG per tablet TAKE 1 TABLET BY MOUTH TWICE DAILY 60 tablet 10    metFORMIN (GLUCOPHAGE) 500 MG tablet TAKE 1 TABLET BY MOUTH TWICE DAILY 62 tablet 10     MG capsule TAKE 1 CAPSULE BY MOUTH TWICE DAILY 62 capsule 10    vitamin D (ERGOCALCIFEROL) 97197 units CAPS capsule TAKE 1 CAPSULE BY MOUTH ONCE EVERY OTHER WEEK ON SATURDAY **RE-ORDER ACCORDINGLY** 2 capsule 11    polyethylene glycol (GLYCOLAX) powder Take 17 g by mouth daily as needed (constipation) Dissolve in 8 ounces of water      SYSTANE 0.4-0.3 % ophthalmic solution Place 1 drop into both eyes 2 times daily   11    acetaminophen (TYLENOL) 325 MG tablet Take 650 mg by mouth every 6 hours as needed for Pain or Fever       tiotropium (SPIRIVA HANDIHALER) 18 MCG inhalation capsule Inhale 1 capsule into the lungs daily 30 capsule 0    amLODIPine (NORVASC) 10 MG tablet Take 10 mg by mouth daily      aspirin 81 MG EC tablet Take 81 mg by mouth daily       No current facility-administered medications for this visit.

## 2020-02-14 ENCOUNTER — OFFICE VISIT (OUTPATIENT)
Dept: PRIMARY CARE CLINIC | Age: 75
End: 2020-02-14
Payer: COMMERCIAL

## 2020-02-14 VITALS
WEIGHT: 201 LBS | BODY MASS INDEX: 32.3 KG/M2 | DIASTOLIC BLOOD PRESSURE: 67 MMHG | HEIGHT: 66 IN | HEART RATE: 88 BPM | RESPIRATION RATE: 16 BRPM | OXYGEN SATURATION: 96 % | SYSTOLIC BLOOD PRESSURE: 132 MMHG

## 2020-02-14 PROCEDURE — G8482 FLU IMMUNIZE ORDER/ADMIN: HCPCS | Performed by: FAMILY MEDICINE

## 2020-02-14 PROCEDURE — 1123F ACP DISCUSS/DSCN MKR DOCD: CPT | Performed by: FAMILY MEDICINE

## 2020-02-14 PROCEDURE — G8417 CALC BMI ABV UP PARAM F/U: HCPCS | Performed by: FAMILY MEDICINE

## 2020-02-14 PROCEDURE — 1090F PRES/ABSN URINE INCON ASSESS: CPT | Performed by: FAMILY MEDICINE

## 2020-02-14 RX ORDER — ASPIRIN 81 MG/1
81 TABLET ORAL DAILY
Qty: 30 TABLET | Refills: 11 | Status: SHIPPED | OUTPATIENT
Start: 2020-02-14

## 2020-02-14 RX ORDER — AMLODIPINE BESYLATE 10 MG/1
10 TABLET ORAL DAILY
Qty: 30 TABLET | Refills: 11 | Status: SHIPPED | OUTPATIENT
Start: 2020-02-14

## 2020-02-14 NOTE — PROGRESS NOTES
2/14/2020     Home visit medically necessary in lieu of an office visit due to: [facility] resident, dementia, uses walker, difficult to get out. HPI:  Patient reports that her knees are hurting again this month. She has had injections by Dr. Coby Ku that help for awhile and also gel that doesn't help that much. Her weight has been going up again, her appetite is good. No falls/hypoglycemic episodes. She is sleeping well. Receiving PT to increase functional ability and decrease falls. Denies CP or SOB. No swelling of LEs. Moving bowels most every day. No urinary symptoms. REVIEW OF SYSTEMS:    GENERAL: Appetite fair. Recent weight change, generally healthy, no change in strength or exercise tolerance. CARDIOVASCULAR: No edema, no chest pains, no murmurs, no palpitations, no syncope, no orthopnea. RESPIRATORY: No shortness of breath, no pain with breathing, no wheezing. OCCAS COUGH GASTROINTESTINAL: No change in appetite, no dysphagia, no heartburn, no constipation or diarrhea, no bowel habit changes, no emesis, no melena, no hemorrhoids. LLQ ABD PAIN GENITOURINARY: No incontinence or retention, no urinary urgency, no nocturia, no frequent UTIs, no dysuria, no change in nature of urine. MUSCULOSKELETAL: No swelling or redness of joints, no limitation of range of motion, no weakness or numbness. OCCAS PAINS IN KNEES/LEGS. NEURO/PSYCH: No weakness, no tremor, no seizures,  no ataxia. No changes in sleep habits, no changes in thought content. CONFUSION AT TIMES. DEPRESSION/ANXIETY. All other systems negative. PAST MEDICAL HISTORY:  (Major events, hospitalizations, surgeries):  Appendectomy. Known allergies: NKA. Ongoing medical problems: T2DM, HTN, COPD, OA, Depressive disorder, anxiety, dementia. Family medical history: Non-contributory. Preventative: Flu vac - 10/2019. Pneumovax - 2019. Last eye exam - 2012. Last dental exam - 2009. Social history: . No children. 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 Completed 8th grade. DNR-CC. PHYSICAL EXAM:    Vitals:    02/14/20 0944   BP: 132/67   Pulse: 88   Resp: 16   SpO2: 96%   Weight: 201 lb (91.2 kg)   Height: 5' 6\" (1.676 m)        GEN:  elderly  WDWN female patient laying in bed in NAD. HEAD:  atraumatic, normocephalic. EYES:  EOMI, PERRL, no cataracts, conjunctivae appear normal.  ENT:   Good hearing, EACs without wax, TM's normal, nasal septum midline, no significant congestion, oral cavity without lesions  edentulous no dentures. ABD:  Well healed scar. Non tender to palp, no palp masses or HSM, normal BS. LUNGS: Clear to ausc., no wheezes, no rhonchi or Rales. HEART:  RRR, no murmurs, or gallops  BACK:  no scoliosis or kyphosis, non-tender to palp. EXTREMITIES: No pedal edema, no ulcerations, varicosities or erythema. No gross deformities. MUSCULOSKELETAL: Decreased ROM of knee joints due to pain and stiffness. Otherwise, joints are non-tender with full ROM. SKIN:  No ulcerations or breakdown, rash, ecchymosis, or other lesions. NEURO:   No tremor, motor UEs 5/5 LEs  5/5, sensory normal, able to stand with extra effort, walks very slowly with mild-mod ataxia. PSYCH:  Pleasant and cooperative. Fluid  slow speech, oriented to person only. No delusional statements. Remembered 2/3 items. Medications reviewed. Labs: 12/23/19 Mammo neg  12/6/19  Glu 63, GFR 53, Prot 5.8, A1c 5.8, H/H 10.3/33.1, ALB 3.4, UA CX neg  9/29/19 UA cx neg  9/3/19 WBC 4.4, HH 10.7/33.7, A1c 6.1, TSH 0.002, Free T4 1.29, lytes nml, Bun 34, Creat 1.4, GFR 37, Glu 84, hepatic nml   6/27/19 HH 9.5/30, GFR 48, lytes nl, LFTs nl, TSH 0.003, A1c 6.5, uric acid 6.3    ASSESSMENT:  Elderly female with has CKD (chronic kidney disease) stage 3, GFR 30-59 ml/min ([facility] [address] Essential hypertension; DM (diabetes mellitus) type II controlled with renal manifestation ([facility]  Insomnia, unspecified; Generalized osteoarthrosis, tablet 10    GLUCAGEN HYPOKIT 1 MG SOLR injection USE 1MG IM FOR GLUCOSE BELOW 60 AND RECHECK IN 15 MINUTES 1 mg 10    Blood Glucose Calibration (RIGHTEST CONTROL) High LIQD USE AS DIRECTED 1 each 11    Blood Glucose Calibration (OT ULTRA/FASTTK CNTRL SOLN) SOLN USE AS DIRECTED 1 each 11    ONE TOUCH ULTRA TEST strip USE AS DIRECTED FOR TESTING BLOOD SUGARS DAILY  15    glucose (GLUTOSE) 40 % GEL Take 15 g by mouth Use as directed      fluticasone (FLONASE) 50 MCG/ACT nasal spray USE 1 SPRAY IN EACH NOSTRIL TWICE DAILY FOR SEASONAL ALLERGIES *SHAKE GENTLY* 16 g 11    diclofenac sodium 1 % GEL APPLY TOPICALLY TO UPPER JOINT (KNEES) THREE(3) TIMES DAILY 100 g 11    loratadine (CLARITIN) 10 MG tablet TAKE ONE(1) TABLET BY MOUTH ONCE DAILY 31 tablet 11    lisinopril-hydrochlorothiazide (PRINZIDE;ZESTORETIC) 20-12.5 MG per tablet TAKE 1 TABLET BY MOUTH TWICE DAILY 60 tablet 10    metFORMIN (GLUCOPHAGE) 500 MG tablet TAKE 1 TABLET BY MOUTH TWICE DAILY 62 tablet 10     MG capsule TAKE 1 CAPSULE BY MOUTH TWICE DAILY 62 capsule 10    vitamin D (ERGOCALCIFEROL) 41332 units CAPS capsule TAKE 1 CAPSULE BY MOUTH ONCE EVERY OTHER WEEK ON SATURDAY **RE-ORDER ACCORDINGLY** 2 capsule 11    polyethylene glycol (GLYCOLAX) powder Take 17 g by mouth daily as needed (constipation) Dissolve in 8 ounces of water      SYSTANE 0.4-0.3 % ophthalmic solution Place 1 drop into both eyes 2 times daily   11    acetaminophen (TYLENOL) 325 MG tablet Take 650 mg by mouth daily 650 mg in AM straight for knee pain, then q 6 prn not to exceed 3GM in 24 hours      tiotropium (SPIRIVA HANDIHALER) 18 MCG inhalation capsule Inhale 1 capsule into the lungs daily 30 capsule 0     No current facility-administered medications for this visit. Return in about 1 month (around 3/14/2020). An  electronic signature was used to authenticate this note.     --Kenny Chavez MD on 2/14/2020 at 9:54 AM

## 2020-03-05 RX ORDER — ONDANSETRON 8 MG/1
TABLET, ORALLY DISINTEGRATING ORAL
Qty: 1 TABLET | Refills: 0 | Status: SHIPPED | OUTPATIENT
Start: 2020-03-05

## 2020-03-12 RX ORDER — TIOTROPIUM BROMIDE 18 UG/1
CAPSULE ORAL; RESPIRATORY (INHALATION)
Qty: 30 CAPSULE | Refills: 11 | Status: SHIPPED | OUTPATIENT
Start: 2020-03-12

## 2020-03-16 ENCOUNTER — OFFICE VISIT (OUTPATIENT)
Dept: PRIMARY CARE CLINIC | Age: 75
End: 2020-03-16
Payer: COMMERCIAL

## 2020-03-16 VITALS
OXYGEN SATURATION: 98 % | HEIGHT: 66 IN | DIASTOLIC BLOOD PRESSURE: 77 MMHG | RESPIRATION RATE: 20 BRPM | SYSTOLIC BLOOD PRESSURE: 152 MMHG | BODY MASS INDEX: 32.66 KG/M2 | WEIGHT: 203.2 LBS | HEART RATE: 65 BPM

## 2020-03-16 PROCEDURE — 1123F ACP DISCUSS/DSCN MKR DOCD: CPT | Performed by: FAMILY MEDICINE

## 2020-03-16 PROCEDURE — G8417 CALC BMI ABV UP PARAM F/U: HCPCS | Performed by: FAMILY MEDICINE

## 2020-03-16 PROCEDURE — G8482 FLU IMMUNIZE ORDER/ADMIN: HCPCS | Performed by: FAMILY MEDICINE

## 2020-03-16 PROCEDURE — 1090F PRES/ABSN URINE INCON ASSESS: CPT | Performed by: FAMILY MEDICINE

## 2020-03-16 ASSESSMENT — PATIENT HEALTH QUESTIONNAIRE - PHQ9
SUM OF ALL RESPONSES TO PHQ9 QUESTIONS 1 & 2: 0
SUM OF ALL RESPONSES TO PHQ QUESTIONS 1-9: 0
SUM OF ALL RESPONSES TO PHQ QUESTIONS 1-9: 0
2. FEELING DOWN, DEPRESSED OR HOPELESS: 0
1. LITTLE INTEREST OR PLEASURE IN DOING THINGS: 0

## 2020-03-16 NOTE — PROGRESS NOTES
3/16/2020     Home visit medically necessary in lieu of an office visit due to: [facility] resident, dementia, uses walker, difficult to get out. HPI:  Patient reports that knees are still hurting when she walks a lot. She has had knee injections by Dr. Tanika Michael end of January that didn't help that much. Her weight continues to go up. Her appetite is good. No falls/hypoglycemic episodes. She is sleeping well. Receiving PT to increase functional ability and decrease falls. Denies CP or SOB. No swelling of LEs. Moving bowels most every day. No urinary symptoms. REVIEW OF SYSTEMS:    GENERAL: Appetite fair. Recent weight change, generally healthy, no change in strength or exercise tolerance. CARDIOVASCULAR: No edema, no chest pains, no murmurs, no palpitations, no syncope, no orthopnea. RESPIRATORY: No shortness of breath, no pain with breathing, no wheezing. OCCAS COUGH GASTROINTESTINAL: No change in appetite, no dysphagia, no heartburn, no constipation or diarrhea, no bowel habit changes, no emesis, no melena, no hemorrhoids. LLQ ABD PAIN GENITOURINARY: No incontinence or retention, no urinary urgency, no nocturia, no frequent UTIs, no dysuria, no change in nature of urine. MUSCULOSKELETAL: No swelling or redness of joints, no limitation of range of motion, no weakness or numbness. OCCAS PAINS IN KNEES/LEGS. NEURO/PSYCH: No weakness, no tremor, no seizures,  no ataxia. No changes in sleep habits, no changes in thought content. CONFUSION AT TIMES. DEPRESSION/ANXIETY. All other systems negative. PAST MEDICAL HISTORY:  (Major events, hospitalizations, surgeries):  Appendectomy. Known allergies: NKA. Ongoing medical problems: T2DM, HTN, COPD, OA, Depressive disorder, anxiety, dementia. Family medical history: Non-contributory. Preventative: Flu vac - 10/2019. Pneumovax - 2019. Last eye exam - 2012. Last dental exam - 2009. Social history: . No children. 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 No alcohol. Completed 8th grade. DNR-CC. PHYSICAL EXAM:    Vitals:    03/16/20 0916   BP: (!) 152/77   Site: Right Wrist   Position: Sitting   Pulse: 65   Resp: 20   SpO2: 98%   Weight: 203 lb 3.2 oz (92.2 kg)   Height: 5' 6\" (1.676 m)      GEN:  elderly  WDWN female patient laying in bed in NAD. HEAD:  atraumatic, normocephalic. EYES:  EOMI, PERRL, no cataracts, conjunctivae appear normal.  ENT:   Good hearing, EACs without wax, TM's normal, nasal septum midline, no significant congestion, oral cavity without lesions  edentulous no dentures. ABD:  Well healed scar. Non tender to palp, no palp masses or HSM, normal BS. LUNGS: Clear to ausc., no wheezes, no rhonchi or Rales. HEART:  RRR, no murmurs, or gallops  BACK:  no scoliosis or kyphosis, non-tender to palp. EXTREMITIES: No pedal edema, no ulcerations, varicosities or erythema. No gross deformities. MUSCULOSKELETAL: Decreased ROM of knee joints due to pain and stiffness. Otherwise, joints are non-tender with full ROM. SKIN:  No ulcerations or breakdown, rash, ecchymosis, or other lesions. NEURO:   No tremor, motor UEs 5/5 LEs  5/5, sensory normal, able to stand with extra effort, walks very slowly with mild-mod ataxia. PSYCH:  Pleasant and cooperative. Fluid  slow speech, oriented to person only. No delusional statements. Remembered 2/3 items. Medications reviewed. Labs: 12/23/19 Mammo neg  12/6/19  Glu 63, GFR 53, Prot 5.8, A1c 5.8, H/H 10.3/33.1, ALB 3.4, UA CX neg  9/29/19 UA cx neg  9/3/19 WBC 4.4, HH 10.7/33.7, A1c 6.1, TSH 0.002, Free T4 1.29, lytes nml, Bun 34, Creat 1.4, GFR 37, Glu 84, hepatic nml   6/27/19 HH 9.5/30, GFR 48, lytes nl, LFTs nl, TSH 0.003, A1c 6.5, uric acid 6.3    ASSESSMENT:  Elderly female with has CKD (chronic kidney disease) stage 3, GFR 30-59 ml/min ([facility] Essential hypertension; DM (diabetes mellitus) type II controlled with renal manifestation ([facility]  Insomnia, unspecified; Generalized osteoarthrosis, involving multiple sites; Obstructive chronic bronchitis without exacerbation ([facility] [address] Personal history of fall; Vascular dementia, uncomplicated ([facility] [address] Other constipation; Difficulty walking; Agitation; and Anxiety on their problem list.       [employer] was seen today for joint pain. Diagnoses and all orders for this visit:    Vascular dementia, uncomplicated ([facility] [address]    Controlled type 2 diabetes mellitus with stage 3 chronic kidney disease, without long-term current use of insulin ([facility])  -     Hemoglobin A1C; Future    Essential hypertension  -     Basic Metabolic Panel; Future    CKD (chronic kidney disease) stage 3, GFR 30-59 ml/min ([facility])    Generalized osteoarthrosis, involving multiple sites       PLAN:  Monitor BP for now. Give Healthshake once daily prn. Discontinue Remeron. Cont Paxil to 40 mg daily for anxiety. Watch for increased anxiety. Discussed diet and exercise. Continue Voltaren gel to 4gms TID to knees. Follow up with Dr. Tacos Hamilton (ortho) on regular basis. Check A1c and BMP every 6 months, next in Sept. Continue other meds as per Rx List.  Recheck 1 month. 40 minutes spent on visit, 25 minutes involved education/counseling regarding cardiac, DJD, T2DM and HTN disease processes, treatment options, meds and coordination of care with [facility] and pharmacy. Current Outpatient Medications   Medication Sig Dispense Refill    SPIRIVA HANDIHALER 18 MCG inhalation capsule INHALE CONTENTS OF 1 CAP VIA HANDIHALER ONCE DAILY. *CAPS SHOULD BE STORED IN BLISTER & ONLY REMOVED BEFORE USE* RINSE MOUTH WITH WATER AFTER 30 capsule 11    ondansetron (ZOFRAN-ODT) 8 MG TBDP disintegrating tablet TAKE 1 TABLET SL EVERY 6 HOURS AS NEEDED 1 tablet 0    amLODIPine (NORVASC) 10 MG tablet Take 1 tablet by mouth daily 30 tablet 11    aspirin (ASPIRIN LOW DOSE) 81 MG EC tablet Take 1 tablet by mouth daily Do not crush or break.  30 tablet 11    ASSURE COMFORT LANCETS 28G MISC CHECK GLUCOSE ONCE DAILY IN AM CALL  IF > 250 100 each 11    hydrALAZINE (APRESOLINE) 100 MG tablet TAKE 1 TABLET BY MOUTH THREE TIMES DAILY FOR HTN. 93 tablet 10    PARoxetine (PAXIL) 40 MG tablet Take 1 tablet by mouth daily 30 tablet 3    Multiple Vitamin (MULTI-VITAMINS) TABS TAKE 1 TABLET BY MOUTH DAILY 30 tablet 10    GLUCAGEN HYPOKIT 1 MG SOLR injection USE 1MG IM FOR GLUCOSE BELOW 60 AND RECHECK IN 15 MINUTES 1 mg 10    Blood Glucose Calibration (RIGHTEST CONTROL) High LIQD USE AS DIRECTED 1 each 11    Blood Glucose Calibration (OT ULTRA/FASTTK CNTRL SOLN) SOLN USE AS DIRECTED 1 each 11    ONE TOUCH ULTRA TEST strip USE AS DIRECTED FOR TESTING BLOOD SUGARS DAILY  15    glucose (GLUTOSE) 40 % GEL Take 15 g by mouth Use as directed      fluticasone (FLONASE) 50 MCG/ACT nasal spray USE 1 SPRAY IN EACH NOSTRIL TWICE DAILY FOR SEASONAL ALLERGIES *SHAKE GENTLY* 16 g 11    diclofenac sodium 1 % GEL APPLY TOPICALLY TO UPPER JOINT (KNEES) THREE(3) TIMES DAILY 100 g 11    loratadine (CLARITIN) 10 MG tablet TAKE ONE(1) TABLET BY MOUTH ONCE DAILY 31 tablet 11    lisinopril-hydrochlorothiazide (PRINZIDE;ZESTORETIC) 20-12.5 MG per tablet TAKE 1 TABLET BY MOUTH TWICE DAILY 60 tablet 10    metFORMIN (GLUCOPHAGE) 500 MG tablet TAKE 1 TABLET BY MOUTH TWICE DAILY 62 tablet 10     MG capsule TAKE 1 CAPSULE BY MOUTH TWICE DAILY 62 capsule 10    vitamin D (ERGOCALCIFEROL) 87854 units CAPS capsule TAKE 1 CAPSULE BY MOUTH ONCE EVERY OTHER WEEK ON SATURDAY **RE-ORDER ACCORDINGLY** 2 capsule 11    polyethylene glycol (GLYCOLAX) powder Take 17 g by mouth daily as needed (constipation) Dissolve in 8 ounces of water      SYSTANE 0.4-0.3 % ophthalmic solution Place 1 drop into both eyes 2 times daily   11    acetaminophen (TYLENOL) 325 MG tablet Take 650 mg by mouth daily 650 mg in AM straight for knee pain, then q 6 prn not to exceed 3GM in 24 hours       No current facility-administered medications for this visit.       Return in about 1 month (around 4/16/2020). An  electronic signature was used to authenticate this note.     --Ho Chua MD on 3/16/2020 at 10:05 AM

## 2020-03-17 ENCOUNTER — HOSPITAL ENCOUNTER (OUTPATIENT)
Age: 75
Discharge: HOME | End: 2020-03-19
Payer: COMMERCIAL

## 2020-03-17 LAB
ANION GAP SERPL CALCULATED.3IONS-SCNC: 13 MMOL/L (ref 7–16)
BUN BLDV-MCNC: 24 MG/DL (ref 8–23)
CALCIUM SERPL-MCNC: 9.3 MG/DL (ref 8.6–10.2)
CHLORIDE BLD-SCNC: 101 MMOL/L (ref 98–107)
CO2: 24 MMOL/L (ref 22–29)
CREAT SERPL-MCNC: 1.1 MG/DL (ref 0.5–1)
GFR AFRICAN AMERICAN: 58
GFR NON-AFRICAN AMERICAN: 58 ML/MIN/1.73
GLUCOSE BLD-MCNC: 93 MG/DL (ref 74–99)
HBA1C MFR BLD: 6.5 % (ref 4–5.6)
POTASSIUM SERPL-SCNC: 4.2 MMOL/L (ref 3.5–5)
SODIUM BLD-SCNC: 138 MMOL/L (ref 132–146)

## 2020-03-17 PROCEDURE — 36415 COLL VENOUS BLD VENIPUNCTURE: CPT

## 2020-03-17 PROCEDURE — 83036 HEMOGLOBIN GLYCOSYLATED A1C: CPT

## 2020-03-17 PROCEDURE — 80048 BASIC METABOLIC PNL TOTAL CA: CPT

## 2020-03-31 RX ORDER — PAROXETINE HYDROCHLORIDE 40 MG/1
40 TABLET, FILM COATED ORAL DAILY
Qty: 30 TABLET | Refills: 11 | Status: SHIPPED | OUTPATIENT
Start: 2020-03-31

## 2020-04-13 RX ORDER — LISINOPRIL AND HYDROCHLOROTHIAZIDE 20; 12.5 MG/1; MG/1
TABLET ORAL
Qty: 60 TABLET | Refills: 10 | Status: SHIPPED | OUTPATIENT
Start: 2020-04-13

## 2020-04-15 ENCOUNTER — OFFICE VISIT (OUTPATIENT)
Dept: PRIMARY CARE CLINIC | Age: 75
End: 2020-04-15
Payer: COMMERCIAL

## 2020-04-15 VITALS
HEIGHT: 66 IN | WEIGHT: 190 LBS | RESPIRATION RATE: 20 BRPM | TEMPERATURE: 98 F | OXYGEN SATURATION: 96 % | DIASTOLIC BLOOD PRESSURE: 64 MMHG | BODY MASS INDEX: 30.53 KG/M2 | SYSTOLIC BLOOD PRESSURE: 129 MMHG | HEART RATE: 68 BPM

## 2020-04-15 PROCEDURE — 1090F PRES/ABSN URINE INCON ASSESS: CPT | Performed by: PHYSICIAN ASSISTANT

## 2020-04-15 PROCEDURE — G8417 CALC BMI ABV UP PARAM F/U: HCPCS | Performed by: PHYSICIAN ASSISTANT

## 2020-04-15 PROCEDURE — 1123F ACP DISCUSS/DSCN MKR DOCD: CPT | Performed by: PHYSICIAN ASSISTANT

## 2020-04-15 NOTE — PROGRESS NOTES
4/15/2020     Home visit medically necessary in lieu of an office visit due to: [facility] resident, dementia, uses walker, difficult to get out. HPI:  Denies fever, chills, cough/URI symptoms. No CP, SOB or XIAO. Has chronic knee pain. Had knee injections by Dr. Pastor Closs end of January that didn't help that much. Confined to room due to COVID epidemic but tries to walk the halls. Recent order written for PT/OT eval and treat for strengthening and endurance. No recent falls. Appetite is good. No hypoglycemic episodes. Health shake is prn. Weight down this month. No swelling of LEs. Moving bowels most every day. No urinary symptoms. Says her mood is good but can't wait to start eating in the dining room again. REVIEW OF SYSTEMS:    GENERAL: Appetite good. Recent weight change, generally healthy, no change in strength or exercise tolerance. CARDIOVASCULAR: No edema, no chest pains, no murmurs, no palpitations, no syncope, no orthopnea. RESPIRATORY: No shortness of breath, no pain with breathing, no wheezing. OCCAS COUGH GASTROINTESTINAL: No change in appetite, no dysphagia, no heartburn, no constipation or diarrhea, no bowel habit changes, no emesis, no melena, no hemorrhoids. LLQ ABD PAIN GENITOURINARY: No incontinence or retention, no urinary urgency, no nocturia, no frequent UTIs, no dysuria, no change in nature of urine. MUSCULOSKELETAL: No swelling or redness of joints, no limitation of range of motion, no weakness or numbness. OCCAS PAINS IN KNEES/LEGS. NEURO/PSYCH: No weakness, no tremor, no seizures,  no ataxia. No changes in sleep habits, no changes in thought content. CONFUSION AT TIMES. DEPRESSION/ANXIETY. All other systems negative. PAST MEDICAL HISTORY:  (Major events, hospitalizations, surgeries):  Appendectomy. Known allergies: NKA. Ongoing medical problems: T2DM, HTN, COPD, OA, Depressive disorder, anxiety, dementia. Family medical history: Non-contributory.   Preventative: Flu vac - female with has CKD (chronic kidney disease) stage 3, GFR 30-59 ml/min ([facility] [address] Essential hypertension; DM (diabetes mellitus) type II controlled with renal manifestation ([facility] [address] Insomnia, unspecified; Generalized osteoarthrosis, involving multiple sites; Obstructive chronic bronchitis without exacerbation ([facility] [address] Personal history of fall; Vascular dementia, uncomplicated ([facility] [address] Other constipation; Difficulty walking; Agitation; and Anxiety on their problem list.     Burton Newton was seen today for hypertension and difficulty walking. Diagnoses and all orders for this visit:    Essential hypertension    Vascular dementia, uncomplicated ([facility] [address]    Obstructive chronic bronchitis without exacerbation ([facility] [address]    Controlled type 2 diabetes mellitus with stage 3 chronic kidney disease, without long-term current use of insulin ([facility])    CKD (chronic kidney disease) stage 3, GFR 30-59 ml/min ([facility])    Difficulty walking       PLAN:  Monitor BP and weight. Cont Paxil to 40 mg daily for anxiety. Discussed diet and exercise. Continue Voltaren gel to 4gms TID to knees. PT/OT as ordered. Follow up with Dr. Alphonse Lynch (ortho) on regular basis. Check A1c and BMP every 6 months, next in Sept. Continue other meds as per Rx List.  Recheck 1 month. 40 minutes spent on visit, 25 minutes involved education/counseling regarding cardiac, DJD, T2DM and HTN disease processes, treatment options, meds and coordination of care with [facility] and pharmacy. Current Outpatient Medications   Medication Sig Dispense Refill    lisinopril-hydroCHLOROthiazide (PRINZIDE;ZESTORETIC) 20-12.5 MG per tablet TAKE 1 TABLET BY MOUTH TWICE DAILY 60 tablet 10    PARoxetine (PAXIL) 40 MG tablet Take 1 tablet by mouth daily 30 tablet 11    SPIRIVA HANDIHALER 18 MCG inhalation capsule INHALE CONTENTS OF 1 CAP VIA HANDIHALER ONCE DAILY. *CAPS SHOULD BE STORED IN BLISTER & ONLY REMOVED BEFORE USE* RINSE MOUTH WITH WATER AFTER 30 capsule 11    ondansetron (ZOFRAN-ODT) 8 MG TBDP disintegrating tablet TAKE 1 TABLET SL EVERY 6 HOURS AS NEEDED 1 tablet 0    amLODIPine (NORVASC) 10 MG tablet Take 1 tablet by mouth daily 30 tablet 11    aspirin (ASPIRIN LOW DOSE) 81 MG EC tablet Take 1 tablet by mouth daily Do not crush or break. 30 tablet 11    ASSURE COMFORT LANCETS 28G MISC CHECK GLUCOSE ONCE DAILY IN AM CALL  IF > 250 100 each 11    hydrALAZINE (APRESOLINE) 100 MG tablet TAKE 1 TABLET BY MOUTH THREE TIMES DAILY FOR HTN.  93 tablet 10    Multiple Vitamin (MULTI-VITAMINS) TABS TAKE 1 TABLET BY MOUTH DAILY 30 tablet 10    GLUCAGEN HYPOKIT 1 MG SOLR injection USE 1MG IM FOR GLUCOSE BELOW 60 AND RECHECK IN 15 MINUTES 1 mg 10    Blood Glucose Calibration (RIGHTEST CONTROL) High LIQD USE AS DIRECTED 1 each 11    Blood Glucose Calibration (OT ULTRA/FASTTK CNTRL SOLN) SOLN USE AS DIRECTED 1 each 11    ONE TOUCH ULTRA TEST strip USE AS DIRECTED FOR TESTING BLOOD SUGARS DAILY  15    glucose (GLUTOSE) 40 % GEL Take 15 g by mouth Use as directed      fluticasone (FLONASE) 50 MCG/ACT nasal spray USE 1 SPRAY IN EACH NOSTRIL TWICE DAILY FOR SEASONAL ALLERGIES *SHAKE GENTLY* 16 g 11    diclofenac sodium 1 % GEL APPLY TOPICALLY TO UPPER JOINT (KNEES) THREE(3) TIMES DAILY 100 g 11    loratadine (CLARITIN) 10 MG tablet TAKE ONE(1) TABLET BY MOUTH ONCE DAILY 31 tablet 11    metFORMIN (GLUCOPHAGE) 500 MG tablet TAKE 1 TABLET BY MOUTH TWICE DAILY 62 tablet 10     MG capsule TAKE 1 CAPSULE BY MOUTH TWICE DAILY 62 capsule 10    vitamin D (ERGOCALCIFEROL) 65629 units CAPS capsule TAKE 1 CAPSULE BY MOUTH ONCE EVERY OTHER WEEK ON SATURDAY **RE-ORDER ACCORDINGLY** 2 capsule 11    polyethylene glycol (GLYCOLAX) powder Take 17 g by mouth daily as needed (constipation) Dissolve in 8 ounces of water      SYSTANE 0.4-0.3 % ophthalmic solution Place 1 drop into both eyes 2 times daily   11    acetaminophen (TYLENOL) 325 MG tablet Take 650 mg by mouth daily 650 mg in AM straight for knee pain, then

## 2020-05-04 RX ORDER — ERGOCALCIFEROL 1.25 MG/1
CAPSULE ORAL
Qty: 2 CAPSULE | Refills: 10 | Status: SHIPPED | OUTPATIENT
Start: 2020-05-04

## 2020-05-08 RX ORDER — DOCUSATE SODIUM 100 MG/1
CAPSULE, LIQUID FILLED ORAL
Qty: 62 CAPSULE | Refills: 11 | Status: SHIPPED | OUTPATIENT
Start: 2020-05-08

## 2020-05-14 ENCOUNTER — OFFICE VISIT (OUTPATIENT)
Dept: PRIMARY CARE CLINIC | Age: 75
End: 2020-05-14
Payer: COMMERCIAL

## 2020-05-14 VITALS
BODY MASS INDEX: 31.82 KG/M2 | HEART RATE: 65 BPM | TEMPERATURE: 96.8 F | RESPIRATION RATE: 20 BRPM | HEIGHT: 66 IN | DIASTOLIC BLOOD PRESSURE: 67 MMHG | OXYGEN SATURATION: 97 % | SYSTOLIC BLOOD PRESSURE: 109 MMHG | WEIGHT: 198 LBS

## 2020-05-14 PROCEDURE — G8417 CALC BMI ABV UP PARAM F/U: HCPCS | Performed by: FAMILY MEDICINE

## 2020-05-14 PROCEDURE — 1123F ACP DISCUSS/DSCN MKR DOCD: CPT | Performed by: FAMILY MEDICINE

## 2020-05-14 PROCEDURE — 1090F PRES/ABSN URINE INCON ASSESS: CPT | Performed by: FAMILY MEDICINE

## 2020-05-14 NOTE — PROGRESS NOTES
IN BLISTER & ONLY REMOVED BEFORE USE* RINSE MOUTH WITH WATER AFTER 30 capsule 11    ondansetron (ZOFRAN-ODT) 8 MG TBDP disintegrating tablet TAKE 1 TABLET SL EVERY 6 HOURS AS NEEDED 1 tablet 0    amLODIPine (NORVASC) 10 MG tablet Take 1 tablet by mouth daily 30 tablet 11    aspirin (ASPIRIN LOW DOSE) 81 MG EC tablet Take 1 tablet by mouth daily Do not crush or break. 30 tablet 11    ASSURE COMFORT LANCETS 28G MISC CHECK GLUCOSE ONCE DAILY IN AM CALL  IF > 250 100 each 11    hydrALAZINE (APRESOLINE) 100 MG tablet TAKE 1 TABLET BY MOUTH THREE TIMES DAILY FOR HTN. 93 tablet 10    Multiple Vitamin (MULTI-VITAMINS) TABS TAKE 1 TABLET BY MOUTH DAILY 30 tablet 10    GLUCAGEN HYPOKIT 1 MG SOLR injection USE 1MG IM FOR GLUCOSE BELOW 60 AND RECHECK IN 15 MINUTES 1 mg 10    Blood Glucose Calibration (RIGHTEST CONTROL) High LIQD USE AS DIRECTED 1 each 11    Blood Glucose Calibration (OT ULTRA/FASTTK CNTRL SOLN) SOLN USE AS DIRECTED 1 each 11    ONE TOUCH ULTRA TEST strip USE AS DIRECTED FOR TESTING BLOOD SUGARS DAILY  15    glucose (GLUTOSE) 40 % GEL Take 15 g by mouth Use as directed      fluticasone (FLONASE) 50 MCG/ACT nasal spray USE 1 SPRAY IN EACH NOSTRIL TWICE DAILY FOR SEASONAL ALLERGIES *SHAKE GENTLY* 16 g 11    diclofenac sodium 1 % GEL APPLY TOPICALLY TO UPPER JOINT (KNEES) THREE(3) TIMES DAILY 100 g 11    loratadine (CLARITIN) 10 MG tablet TAKE ONE(1) TABLET BY MOUTH ONCE DAILY 31 tablet 11    polyethylene glycol (GLYCOLAX) powder Take 17 g by mouth daily as needed (constipation) Dissolve in 8 ounces of water      SYSTANE 0.4-0.3 % ophthalmic solution Place 1 drop into both eyes 2 times daily   11    acetaminophen (TYLENOL) 325 MG tablet Take 650 mg by mouth daily 650 mg in AM straight for knee pain, then q 6 prn not to exceed 3GM in 24 hours       No current facility-administered medications for this visit. Return in about 1 month (around 6/14/2020).     An  electronic signature was used to authenticate this note.     --Nereida Cee MD on 5/14/2020 at 10:17 AM

## 2020-06-01 RX ORDER — POLYETHYLENE GLYCOL 400 AND PROPYLENE GLYCOL 4; 3 MG/ML; MG/ML
SOLUTION/ DROPS OPHTHALMIC
Qty: 15 ML | Refills: 11 | Status: SHIPPED | OUTPATIENT
Start: 2020-06-01

## 2020-06-10 RX ORDER — LORATADINE 10 MG/1
TABLET ORAL
Qty: 31 TABLET | Refills: 10 | Status: SHIPPED | OUTPATIENT
Start: 2020-06-10

## 2020-06-10 NOTE — PROGRESS NOTES
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 Completed 8th grade. DNR-CC. PHYSICAL EXAM:    Vitals:    06/11/20 1008   BP: 120/63   Site: Left Wrist   Position: Sitting   Pulse: 68   Resp: 20   Temp: 98.2 °F (36.8 °C)   TempSrc: Infrared   SpO2: 97%   Weight: 197 lb 3.2 oz (89.4 kg)   Height: 5' 6\" (1.676 m)      GEN:  elderly  WDWN female patient laying in bed in NAD. HEAD:  atraumatic, normocephalic. EYES:  EOMI, PERRL, no cataracts, conjunctivae appear normal.  ENT:   Good hearing, EACs without wax, TM's normal, nasal septum midline, no significant congestion, oral cavity without lesions, edentulous no dentures. ABD:  Well healed scar. Non tender to palp, no palp masses or HSM, normal BS. LUNGS: Clear to auscultation. No wheezes, no rhonchi or rales. HEART:  RRR, no murmurs, or gallops  BACK:  no scoliosis or kyphosis, non-tender to palp. EXTREMITIES: No pedal edema, no ulcerations, varicosities or erythema. No gross deformities. MUSCULOSKELETAL: Decreased ROM of knee joints due to pain and stiffness. Otherwise, joints are non-tender with full ROM. SKIN:  No ulcerations or breakdown, rash, ecchymosis, or other lesions. NEURO:   No tremor, motor UEs 5/5 LEs  5/5, sensory normal, able to stand with extra effort, walks very slowly with mild-mod ataxia. PSYCH:  Pleasant and cooperative. Fluid  slow speech, oriented to person only. No delusional statements. Remembered 2/3 items. Medications reviewed. Labs: 3/17/20 A1c 6.5, GFR 58, Bun/Creat 24/1.1, lytes nml  12/23/19 Mammo neg  12/6/19  Glu 63, GFR 53, Prot 5.8, A1c 5.8, H/H 10.3/33.1, ALB 3.4, UA CX neg  9/29/19 UA cx neg  9/3/19 WBC 4.4, HH 10.7/33.7, A1c 6.1, TSH 0.002, Free T4 1.29, lytes nml, Bun 34, Creat 1.4, GFR 37, Glu 84, hepatic nml      ASSESSMENT:  Elderly female with has CKD (chronic kidney disease) stage 3, GFR 30-59 ml/min ([facility] [address]  Essential hypertension; DM (diabetes mellitus) type II controlled with renal manifestation ([facility] [address] Insomnia, unspecified; Generalized osteoarthrosis, involving multiple sites; Obstructive chronic bronchitis without exacerbation ([facility] [address] Personal history of fall; Vascular dementia, uncomplicated ([facility] [address] Other constipation; Difficulty walking; Agitation; and Anxiety on their problem list.     Bert Herrera was seen today for joint pain. Diagnoses and all orders for this visit:    Essential hypertension    Controlled type 2 diabetes mellitus with stage 3 chronic kidney disease, without long-term current use of insulin ([facility])    Generalized osteoarthrosis, involving multiple sites    CKD (chronic kidney disease) stage 3, GFR 30-59 ml/min ([facility])    Vascular dementia, uncomplicated ([facility])       PLAN:  Discussed diet and exercise. Continue Voltaren gel to 4gms TID to knees. PT/OT as ordered. Follow up with Dr. Nile Hester (ortho) on regular basis. Check A1c and BMP every 6 months, next in Sept. Continue other meds as per Rx List.  Recheck 1 month. 40 minutes spent on visit, 25 minutes involved education/counseling regarding cardiac, DJD, T2DM and HTN disease processes, treatment options, meds and coordination of care with [facility] and pharmacy.      Current Outpatient Medications   Medication Sig Dispense Refill    loratadine (CLARITIN) 10 MG tablet TAKE ONE(1) TABLET BY MOUTH ONCE DAILY 31 tablet 10    SYSTANE 0.4-0.3 % ophthalmic solution INSTILL 1 DROP INTO AFFECTED EYE(S) TWICE DAILY 15 mL 11    diclofenac sodium (VOLTAREN) 1 % GEL APPLY TOPICALLY TO UPPER JOINT (KNEES) THREE(3) TIMES DAILY **RE-ORDER ACCORDINGLY** 100 g 10    metFORMIN (GLUCOPHAGE) 500 MG tablet TAKE 1 TABLET BY MOUTH TWICE DAILY 62 tablet 11     MG capsule TAKE 1 CAPSULE BY MOUTH TWICE DAILY 62 capsule 11    vitamin D (ERGOCALCIFEROL) 1.25 MG (90052 UT) CAPS capsule TAKE 1 CAPSULE BY MOUTH ONCE EVERY OTHER WEEK ON SATURDAY **RE-ORDER ACCORDINGLY** 2 capsule 10    lisinopril-hydroCHLOROthiazide (PRINZIDE;ZESTORETIC) 20-12.5 MG per

## 2020-06-11 ENCOUNTER — OFFICE VISIT (OUTPATIENT)
Dept: PRIMARY CARE CLINIC | Age: 75
End: 2020-06-11
Payer: COMMERCIAL

## 2020-06-11 VITALS
HEIGHT: 66 IN | RESPIRATION RATE: 20 BRPM | BODY MASS INDEX: 31.69 KG/M2 | HEART RATE: 68 BPM | DIASTOLIC BLOOD PRESSURE: 63 MMHG | OXYGEN SATURATION: 97 % | TEMPERATURE: 98.2 F | SYSTOLIC BLOOD PRESSURE: 120 MMHG | WEIGHT: 197.2 LBS

## 2020-06-11 PROCEDURE — 1090F PRES/ABSN URINE INCON ASSESS: CPT | Performed by: FAMILY MEDICINE

## 2020-06-11 PROCEDURE — 1123F ACP DISCUSS/DSCN MKR DOCD: CPT | Performed by: FAMILY MEDICINE

## 2020-06-11 PROCEDURE — G8417 CALC BMI ABV UP PARAM F/U: HCPCS | Performed by: FAMILY MEDICINE

## 2020-06-15 ENCOUNTER — TELEPHONE (OUTPATIENT)
Dept: PRIMARY CARE CLINIC | Age: 75
End: 2020-06-15

## 2020-06-15 RX ORDER — NYSTATIN 100000 [USP'U]/G
POWDER TOPICAL
Qty: 1 BOTTLE | Refills: 5 | Status: SHIPPED | OUTPATIENT
Start: 2020-06-15 | End: 2020-08-21 | Stop reason: ALTCHOICE

## 2020-06-15 NOTE — TELEPHONE ENCOUNTER
Fax from [facility] 1949. Bert Herrera is red and excoriated under R armpit. Area cleansed and dried. May we have an order for Nystatin power BID until healed?

## 2020-07-20 NOTE — PROGRESS NOTES
history: . 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 Completed 8th grade. DNR-CC. PHYSICAL EXAM:    Vitals:    07/21/20 1019   BP: (!) 140/80   Site: Left Upper Arm   Position: Sitting   Pulse: 67   Resp: 20   Temp: 96.6 °F (35.9 °C)   SpO2: 96%   Weight: 200 lb 12.8 oz (91.1 kg)   Height: 5' 6\" (1.676 m)      GEN:  elderly  WDWN female patient laying in bed in NAD. HEAD:  atraumatic, normocephalic. EYES:  EOMI, PERRL, no cataracts, conjunctivae appear normal.  ENT:   Good hearing, EACs without wax, TM's normal, nasal septum midline, no significant congestion, oral cavity without lesions, edentulous no dentures. ABD:  Well healed scar. Non tender to palp, no palp masses or HSM, normal BS. LUNGS: Clear to auscultation. No wheezes, no rhonchi or rales. HEART:  RRR, no murmurs, or gallops  BACK:  no scoliosis or kyphosis, non-tender to palp. EXTREMITIES: No pedal edema, no ulcerations, varicosities or erythema. No gross deformities. MUSCULOSKELETAL: Decreased ROM of knee joints due to pain and stiffness. Otherwise, joints are non-tender with full ROM. SKIN:  No ulcerations or breakdown, rash, ecchymosis, or other lesions. NEURO:   No tremor, motor UEs 5/5 LEs  5/5, sensory normal, able to stand with extra effort, walks very slowly with mild-mod ataxia. PSYCH:  Pleasant and cooperative. Fluid  slow speech, oriented to person only. No delusional statements. Remembered 2/3 items. Medications reviewed. Labs: 3/17/20 A1c 6.5, GFR 58, Bun/Creat 24/1.1, lytes nml  12/23/19 Mammo neg  12/6/19  Glu 63, GFR 53, Prot 5.8, A1c 5.8, H/H 10.3/33.1, ALB 3.4, UA CX neg  9/29/19 UA cx neg  9/3/19 WBC 4.4, HH 10.7/33.7, A1c 6.1, TSH 0.002, Free T4 1.29, lytes nml, Bun 34, Creat 1.4, GFR 37, Glu 84, hepatic nml      ASSESSMENT:  Elderly female with has CKD (chronic kidney disease) stage 3, GFR 30-59 ml/min ([facility] [address]  Essential hypertension; DM (diabetes mellitus) type II controlled with renal manifestation ([facility] [address] Insomnia, unspecified; Generalized osteoarthrosis, involving multiple sites; Obstructive chronic bronchitis without exacerbation ([facility] [address] Personal history of fall; Vascular dementia, uncomplicated ([facility] [address] Other constipation; Difficulty walking; Agitation; and Anxiety on their problem list.     Olman Telles was seen today for dementia and joint pain. Diagnoses and all orders for this visit:    Essential hypertension    Vascular dementia, uncomplicated ([facility] [address]    Controlled type 2 diabetes mellitus with stage 3 chronic kidney disease, without long-term current use of insulin ([facility])    Obstructive chronic bronchitis without exacerbation ([facility])    Generalized osteoarthrosis, involving multiple sites       PLAN:  Discussed diet and exercise. Continue Voltaren gel to 4gms TID to knees. PT/OT as ordered. Follow up with Dr. Javy Acosta (ortho) on regular basis. Check A1c and BMP every 6 months, next in Sept. Continue other meds as per Rx List.  Recheck 1 month. 40 minutes spent on visit, 25 minutes involved education/counseling regarding cardiac, DJD, T2DM and HTN disease processes, treatment options, meds and coordination of care with [facility] and pharmacy. Current Outpatient Medications   Medication Sig Dispense Refill    nystatin (MYCOSTATIN) 678903 UNIT/GM powder Apply daily to affected area.  1 Bottle 5    loratadine (CLARITIN) 10 MG tablet TAKE ONE(1) TABLET BY MOUTH ONCE DAILY 31 tablet 10    SYSTANE 0.4-0.3 % ophthalmic solution INSTILL 1 DROP INTO AFFECTED EYE(S) TWICE DAILY 15 mL 11    diclofenac sodium (VOLTAREN) 1 % GEL APPLY TOPICALLY TO UPPER JOINT (KNEES) THREE(3) TIMES DAILY **RE-ORDER ACCORDINGLY** 100 g 10    metFORMIN (GLUCOPHAGE) 500 MG tablet TAKE 1 TABLET BY MOUTH TWICE DAILY 62 tablet 11     MG capsule TAKE 1 CAPSULE BY MOUTH TWICE DAILY 62 capsule 11    vitamin D (ERGOCALCIFEROL) 1.25 MG (29531 UT) CAPS capsule TAKE 1 CAPSULE BY MOUTH ONCE EVERY OTHER WEEK ON SATURDAY **RE-ORDER ACCORDINGLY** 2 capsule 10    lisinopril-hydroCHLOROthiazide (PRINZIDE;ZESTORETIC) 20-12.5 MG per tablet TAKE 1 TABLET BY MOUTH TWICE DAILY 60 tablet 10    PARoxetine (PAXIL) 40 MG tablet Take 1 tablet by mouth daily 30 tablet 11    SPIRIVA HANDIHALER 18 MCG inhalation capsule INHALE CONTENTS OF 1 CAP VIA HANDIHALER ONCE DAILY. *CAPS SHOULD BE STORED IN BLISTER & ONLY REMOVED BEFORE USE* RINSE MOUTH WITH WATER AFTER 30 capsule 11    ondansetron (ZOFRAN-ODT) 8 MG TBDP disintegrating tablet TAKE 1 TABLET SL EVERY 6 HOURS AS NEEDED 1 tablet 0    amLODIPine (NORVASC) 10 MG tablet Take 1 tablet by mouth daily 30 tablet 11    aspirin (ASPIRIN LOW DOSE) 81 MG EC tablet Take 1 tablet by mouth daily Do not crush or break. 30 tablet 11    ASSURE COMFORT LANCETS 28G MISC CHECK GLUCOSE ONCE DAILY IN AM CALL  IF > 250 100 each 11    hydrALAZINE (APRESOLINE) 100 MG tablet TAKE 1 TABLET BY MOUTH THREE TIMES DAILY FOR HTN.  93 tablet 10    Multiple Vitamin (MULTI-VITAMINS) TABS TAKE 1 TABLET BY MOUTH DAILY 30 tablet 10    GLUCAGEN HYPOKIT 1 MG SOLR injection USE 1MG IM FOR GLUCOSE BELOW 60 AND RECHECK IN 15 MINUTES 1 mg 10    Blood Glucose Calibration (RIGHTEST CONTROL) High LIQD USE AS DIRECTED 1 each 11    Blood Glucose Calibration (OT ULTRA/FASTTK CNTRL SOLN) SOLN USE AS DIRECTED 1 each 11    ONE TOUCH ULTRA TEST strip USE AS DIRECTED FOR TESTING BLOOD SUGARS DAILY  15    glucose (GLUTOSE) 40 % GEL Take 15 g by mouth Use as directed      fluticasone (FLONASE) 50 MCG/ACT nasal spray USE 1 SPRAY IN EACH NOSTRIL TWICE DAILY FOR SEASONAL ALLERGIES *SHAKE GENTLY* 16 g 11    polyethylene glycol (GLYCOLAX) powder Take 17 g by mouth daily as needed (constipation) Dissolve in 8 ounces of water      acetaminophen (TYLENOL) 325 MG tablet Take 650 mg by mouth daily 650 mg in AM straight for knee pain, then q 6 prn not to exceed 3GM in 24 hours       No current facility-administered medications for this visit. Return in about 1 month (around 8/21/2020). An  electronic signature was used to authenticate this note.     --Willena Schilder, MD on 7/21/2020 at 11:18 AM

## 2020-07-21 ENCOUNTER — OFFICE VISIT (OUTPATIENT)
Dept: PRIMARY CARE CLINIC | Age: 75
End: 2020-07-21
Payer: COMMERCIAL

## 2020-07-21 VITALS
DIASTOLIC BLOOD PRESSURE: 80 MMHG | RESPIRATION RATE: 20 BRPM | BODY MASS INDEX: 32.27 KG/M2 | OXYGEN SATURATION: 96 % | SYSTOLIC BLOOD PRESSURE: 140 MMHG | HEIGHT: 66 IN | HEART RATE: 67 BPM | TEMPERATURE: 96.6 F | WEIGHT: 200.8 LBS

## 2020-07-21 PROCEDURE — G8417 CALC BMI ABV UP PARAM F/U: HCPCS | Performed by: FAMILY MEDICINE

## 2020-07-21 PROCEDURE — 1090F PRES/ABSN URINE INCON ASSESS: CPT | Performed by: FAMILY MEDICINE

## 2020-07-21 PROCEDURE — 1123F ACP DISCUSS/DSCN MKR DOCD: CPT | Performed by: FAMILY MEDICINE

## 2020-08-21 ENCOUNTER — OFFICE VISIT (OUTPATIENT)
Dept: PRIMARY CARE CLINIC | Age: 75
End: 2020-08-21
Payer: COMMERCIAL

## 2020-08-21 VITALS
SYSTOLIC BLOOD PRESSURE: 136 MMHG | BODY MASS INDEX: 32.82 KG/M2 | WEIGHT: 204.2 LBS | HEART RATE: 67 BPM | TEMPERATURE: 97.6 F | RESPIRATION RATE: 20 BRPM | HEIGHT: 66 IN | OXYGEN SATURATION: 97 % | DIASTOLIC BLOOD PRESSURE: 68 MMHG

## 2020-08-21 PROCEDURE — G8417 CALC BMI ABV UP PARAM F/U: HCPCS | Performed by: PHYSICIAN ASSISTANT

## 2020-08-21 PROCEDURE — 1090F PRES/ABSN URINE INCON ASSESS: CPT | Performed by: PHYSICIAN ASSISTANT

## 2020-08-21 PROCEDURE — 1123F ACP DISCUSS/DSCN MKR DOCD: CPT | Performed by: PHYSICIAN ASSISTANT

## 2020-08-21 NOTE — PROGRESS NOTES
8/21/2020     Home visit medically necessary in lieu of an office visit due to: [facility] resident, dementia, uses walker, difficult to get out. HPI:  Physical therapy continues with Dionte and she thinks it is helping. Likes move to room # 228. Walking in halls more now and is attending [facility] activities. Chronic arthritic knee pain continues - takes Tylenol and has Voltaren oinment  TID> She has had no swelling of LEs. She has had no recent falls. She has had no fever, chills, cough symptoms. No CP, SOB or XIAO. Health shake is prn. She is moving bowels most every day. No urinary symptoms. She has been in good spirits most the time and she is sleeping well. REVIEW OF SYSTEMS:    GENERAL: Appetite good. Recent weight gain, generally healthy, no change in strength or exercise tolerance. CARDIOVASCULAR: No edema, no chest pains, no murmurs, no palpitations, no syncope, no orthopnea. RESPIRATORY: No shortness of breath, no pain with breathing, no wheezing. OCCAS COUGH TO CLEAR THROAT. GASTROINTESTINAL: No change in appetite, no dysphagia, no heartburn, no constipation or diarrhea, no bowel habit changes, no emesis, no melena, no hemorrhoids. GENITOURINARY: No incontinence or retention, no urinary urgency, no nocturia, no frequent UTIs, no dysuria, no change in nature of urine. MUSCULOSKELETAL: No swelling or redness of joints, no limitation of range of motion, no weakness or numbness. PAINS IN KNEES/LEGS. NEURO/PSYCH: No weakness, no tremor, no seizures,  no ataxia. No changes in sleep habits, no changes in thought content. CONFUSION AT TIMES, MEMORY LOSS. DEPRESSION/ANXIETY. All other systems negative. PAST MEDICAL HISTORY:  (Major events, hospitalizations, surgeries):  Appendectomy. Known allergies: NKA. Ongoing medical problems: T2DM, HTN, COPD, OA, Depressive disorder, anxiety, dementia. Family medical history: Non-contributory. Preventative: Flu vac - 10/2019. Pneumovax - 2019.  Last eye exam - 2012. Last dental exam - 2009. Social history: . 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 Completed 8th grade. DNR-CC. PHYSICAL EXAM:    Vitals:    08/21/20 0907   BP: 136/68   Pulse: 67   Resp: 20   Temp: 97.6 °F (36.4 °C)   TempSrc: Tympanic   SpO2: 97%   Weight: 204 lb 3.2 oz (92.6 kg)   Height: 5' 6\" (1.676 m)      GEN:  elderly  WDWN female patient laying in bed in NAD. HEAD:  atraumatic, normocephalic. EYES:  EOMI, PERRL, no cataracts, conjunctivae appear normal.  ENT:   Good hearing, EACs without wax, TM's normal, nasal septum midline, no significant congestion, oral cavity without lesions, edentulous no dentures. ABD:  Well healed scar. Non tender to palp, no palp masses or HSM, normal BS. LUNGS: Clear to auscultation. No wheezes, no rhonchi or rales. HEART:  RRR, no murmurs, or gallops  BACK:  no scoliosis or kyphosis, non-tender to palp. EXTREMITIES: No pedal edema, no ulcerations, varicosities or erythema. No gross deformities. MUSCULOSKELETAL: Decreased ROM of knee joints due to pain and stiffness. Otherwise, joints are non-tender with full ROM. SKIN:  No ulcerations or breakdown, rash, ecchymosis, or other lesions. NEURO:   No tremor, motor UEs 5/5 LEs  5/5, sensory normal, able to stand with extra effort, walks very slowly with mild-mod ataxia. PSYCH:  Pleasant and cooperative. Fluid  slow speech, oriented to person only. No delusional statements. Remembered 2/3 items. Medications reviewed. Labs: 3/17/20 A1c 6.5, GFR 58, Bun/Creat 24/1.1, lytes nml  12/23/19 Mammo neg  12/6/19  Glu 63, GFR 53, Prot 5.8, A1c 5.8, H/H 10.3/33.1, ALB 3.4, UA CX neg  9/29/19 UA cx neg  9/3/19 WBC 4.4, HH 10.7/33.7, A1c 6.1, TSH 0.002, Free T4 1.29, lytes nml, Bun 34, Creat 1.4, GFR 37, Glu 84, hepatic nml      ASSESSMENT:  Elderly female with has CKD (chronic kidney disease) stage 3, GFR 30-59 ml/min ([facility] [address]  Essential hypertension; DM (diabetes lisinopril-hydroCHLOROthiazide (PRINZIDE;ZESTORETIC) 20-12.5 MG per tablet TAKE 1 TABLET BY MOUTH TWICE DAILY 60 tablet 10    PARoxetine (PAXIL) 40 MG tablet Take 1 tablet by mouth daily 30 tablet 11    SPIRIVA HANDIHALER 18 MCG inhalation capsule INHALE CONTENTS OF 1 CAP VIA HANDIHALER ONCE DAILY. *CAPS SHOULD BE STORED IN BLISTER & ONLY REMOVED BEFORE USE* RINSE MOUTH WITH WATER AFTER 30 capsule 11    ondansetron (ZOFRAN-ODT) 8 MG TBDP disintegrating tablet TAKE 1 TABLET SL EVERY 6 HOURS AS NEEDED 1 tablet 0    amLODIPine (NORVASC) 10 MG tablet Take 1 tablet by mouth daily 30 tablet 11    aspirin (ASPIRIN LOW DOSE) 81 MG EC tablet Take 1 tablet by mouth daily Do not crush or break. 30 tablet 11    ASSURE COMFORT LANCETS 28G MISC CHECK GLUCOSE ONCE DAILY IN AM CALL  IF > 250 100 each 11    hydrALAZINE (APRESOLINE) 100 MG tablet TAKE 1 TABLET BY MOUTH THREE TIMES DAILY FOR HTN. 93 tablet 10    Multiple Vitamin (MULTI-VITAMINS) TABS TAKE 1 TABLET BY MOUTH DAILY 30 tablet 10    GLUCAGEN HYPOKIT 1 MG SOLR injection USE 1MG IM FOR GLUCOSE BELOW 60 AND RECHECK IN 15 MINUTES 1 mg 10    Blood Glucose Calibration (RIGHTEST CONTROL) High LIQD USE AS DIRECTED 1 each 11    Blood Glucose Calibration (OT ULTRA/FASTTK CNTRL SOLN) SOLN USE AS DIRECTED 1 each 11    ONE TOUCH ULTRA TEST strip USE AS DIRECTED FOR TESTING BLOOD SUGARS DAILY  15    glucose (GLUTOSE) 40 % GEL Take 15 g by mouth Use as directed      fluticasone (FLONASE) 50 MCG/ACT nasal spray USE 1 SPRAY IN EACH NOSTRIL TWICE DAILY FOR SEASONAL ALLERGIES *SHAKE GENTLY* 16 g 11    polyethylene glycol (GLYCOLAX) powder Take 17 g by mouth daily as needed (constipation) Dissolve in 8 ounces of water      acetaminophen (TYLENOL) 325 MG tablet Take 650 mg by mouth daily 650 mg in AM straight for knee pain, then q 6 prn not to exceed 3GM in 24 hours       No current facility-administered medications for this visit.       Return in about 1 month (around 9/21/2020). An electronic signature was used to authenticate this note.     --Chata Davis PA-C on 8/21/2020 at 9:59 AM

## 2020-08-27 RX ORDER — ACETAMINOPHEN 325 MG/1
650 TABLET ORAL DAILY
Qty: 60 TABLET | Refills: 11 | Status: SHIPPED | OUTPATIENT
Start: 2020-08-27

## 2020-09-15 RX ORDER — MULTIVITAMIN WITH FOLIC ACID 400 MCG
TABLET ORAL
Qty: 30 TABLET | Refills: 10 | Status: SHIPPED | OUTPATIENT
Start: 2020-09-15

## 2020-09-18 ENCOUNTER — OFFICE VISIT (OUTPATIENT)
Dept: PRIMARY CARE CLINIC | Age: 75
End: 2020-09-18
Payer: COMMERCIAL

## 2020-09-18 VITALS
TEMPERATURE: 97.8 F | BODY MASS INDEX: 32.53 KG/M2 | OXYGEN SATURATION: 97 % | RESPIRATION RATE: 18 BRPM | WEIGHT: 202.4 LBS | HEIGHT: 66 IN | DIASTOLIC BLOOD PRESSURE: 78 MMHG | HEART RATE: 64 BPM | SYSTOLIC BLOOD PRESSURE: 130 MMHG

## 2020-09-18 PROBLEM — E66.9 OBESITY (BMI 30.0-34.9): Status: ACTIVE | Noted: 2020-09-18

## 2020-09-18 PROCEDURE — 1123F ACP DISCUSS/DSCN MKR DOCD: CPT | Performed by: PHYSICIAN ASSISTANT

## 2020-09-18 PROCEDURE — G8417 CALC BMI ABV UP PARAM F/U: HCPCS | Performed by: PHYSICIAN ASSISTANT

## 2020-09-18 PROCEDURE — 1090F PRES/ABSN URINE INCON ASSESS: CPT | Performed by: PHYSICIAN ASSISTANT

## 2020-09-18 NOTE — PROGRESS NOTES
9/18/2020     Home visit medically necessary in lieu of an office visit due to: [facility] resident, dementia, uses walker, difficult to get out. HPI:  Still likes her new room #228 - says it seems bigger and is brighter. PT continues  Walking in halls more now and is attending [facility] activities. Chronic arthritic knee pain continues - takes Tylenol and has Voltaren oinment  TID> She has had no swelling of LEs. She has had no recent falls. She has had no fever, chills, cough symptoms. No CP, SOB or XIAO. Health shake is prn. She is moving bowels most every day. No urinary symptoms. She has been in good spirits most the time and she is sleeping well. REVIEW OF SYSTEMS:    GENERAL: Appetite good. Recent weight gain, generally healthy, no change in strength or exercise tolerance. CARDIOVASCULAR: No edema, no chest pains, no murmurs, no palpitations, no syncope, no orthopnea. RESPIRATORY: No shortness of breath, no pain with breathing, no wheezing. OCCAS COUGH TO CLEAR THROAT. GASTROINTESTINAL: No change in appetite, no dysphagia, no heartburn, no constipation or diarrhea, no bowel habit changes, no emesis, no melena, no hemorrhoids. GENITOURINARY: No incontinence or retention, no urinary urgency, no nocturia, no frequent UTIs, no dysuria, no change in nature of urine. MUSCULOSKELETAL: No swelling or redness of joints, no limitation of range of motion, no weakness or numbness. PAINS IN KNEES/LEGS. NEURO/PSYCH: No weakness, no tremor, no seizures,  no ataxia. No changes in sleep habits, no changes in thought content. CONFUSION AT TIMES, MEMORY LOSS. DEPRESSION/ANXIETY. All other systems negative. PAST MEDICAL HISTORY:  (Major events, hospitalizations, surgeries):  Appendectomy. Known allergies: NKA. Ongoing medical problems: T2DM, HTN, COPD, OA, Depressive disorder, anxiety, dementia. Family medical history: Non-contributory. Preventative: Flu vac - 10/2019. Pneumovax - 2019. Last eye exam - 2012.  Last dental exam - 2009. 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 DNR-CC. PHYSICAL EXAM:      Vitals:    09/18/20 1119   BP: 130/78   Pulse: 64   Resp: 18   Temp: 97.8 °F (36.6 °C)   TempSrc: Temporal   SpO2: 97%   Weight: 202 lb 6.4 oz (91.8 kg)   Height: 5' 6\" (1.676 m)        GEN:  elderly  WDWN female patient laying in bed in NAD. HEAD:  atraumatic, normocephalic. EYES:  EOMI, PERRL, no cataracts, conjunctivae appear normal.  ENT:   Good hearing, EACs without wax, TM's normal, nasal septum midline, no significant congestion, oral cavity without lesions, edentulous no dentures. ABD:  Well healed scar. Non tender to palp, no palp masses or HSM, normal BS. LUNGS: Clear to auscultation. No wheezes, no rhonchi or rales. HEART:  RRR, no murmurs, or gallops  BACK:  no scoliosis or kyphosis, non-tender to palp. EXTREMITIES: No pedal edema, no ulcerations, varicosities or erythema. No gross deformities. MUSCULOSKELETAL: Decreased ROM of knee joints due to pain and stiffness. Otherwise, joints are non-tender with full ROM. SKIN:  No ulcerations or breakdown, rash, ecchymosis, or other lesions. NEURO:   No tremor, motor UEs 5/5 LEs  5/5, sensory normal, able to stand with extra effort, walks very slowly with mild-mod ataxia. PSYCH:  Pleasant and cooperative. Fluid  slow speech, oriented to person only. No delusional statements. Remembered 2/3 items. Medications reviewed. Labs: 3/17/20 A1c 6.5, GFR 58, Bun/Creat 24/1.1, lytes nml  12/23/19 Mammo neg  12/6/19  Glu 63, GFR 53, Prot 5.8, A1c 5.8, H/H 10.3/33.1, ALB 3.4, UA CX neg  9/29/19 UA cx neg  9/3/19 WBC 4.4, HH 10.7/33.7, A1c 6.1, TSH 0.002, Free T4 1.29, lytes nml, Bun 34, Creat 1.4, GFR 37, Glu 84, hepatic nml      ASSESSMENT:  Elderly female with has CKD (chronic kidney disease) stage 3, GFR 30-59 ml/min ([facility] [address]  Essential hypertension; DM (diabetes (VOLTAREN) 1 % GEL APPLY TOPICALLY TO UPPER JOINT (KNEES) THREE(3) TIMES DAILY **RE-ORDER ACCORDINGLY** 100 g 10    metFORMIN (GLUCOPHAGE) 500 MG tablet TAKE 1 TABLET BY MOUTH TWICE DAILY 62 tablet 11     MG capsule TAKE 1 CAPSULE BY MOUTH TWICE DAILY 62 capsule 11    vitamin D (ERGOCALCIFEROL) 1.25 MG (81572 UT) CAPS capsule TAKE 1 CAPSULE BY MOUTH ONCE EVERY OTHER WEEK ON SATURDAY **RE-ORDER ACCORDINGLY** 2 capsule 10    lisinopril-hydroCHLOROthiazide (PRINZIDE;ZESTORETIC) 20-12.5 MG per tablet TAKE 1 TABLET BY MOUTH TWICE DAILY 60 tablet 10    PARoxetine (PAXIL) 40 MG tablet Take 1 tablet by mouth daily 30 tablet 11    SPIRIVA HANDIHALER 18 MCG inhalation capsule INHALE CONTENTS OF 1 CAP VIA HANDIHALER ONCE DAILY. *CAPS SHOULD BE STORED IN BLISTER & ONLY REMOVED BEFORE USE* RINSE MOUTH WITH WATER AFTER 30 capsule 11    ondansetron (ZOFRAN-ODT) 8 MG TBDP disintegrating tablet TAKE 1 TABLET SL EVERY 6 HOURS AS NEEDED 1 tablet 0    amLODIPine (NORVASC) 10 MG tablet Take 1 tablet by mouth daily 30 tablet 11    aspirin (ASPIRIN LOW DOSE) 81 MG EC tablet Take 1 tablet by mouth daily Do not crush or break. 30 tablet 11    ASSURE COMFORT LANCETS 28G MISC CHECK GLUCOSE ONCE DAILY IN AM CALL  IF > 250 100 each 11    hydrALAZINE (APRESOLINE) 100 MG tablet TAKE 1 TABLET BY MOUTH THREE TIMES DAILY FOR HTN.  93 tablet 10    GLUCAGEN HYPOKIT 1 MG SOLR injection USE 1MG IM FOR GLUCOSE BELOW 60 AND RECHECK IN 15 MINUTES 1 mg 10    Blood Glucose Calibration (RIGHTEST CONTROL) High LIQD USE AS DIRECTED 1 each 11    Blood Glucose Calibration (OT ULTRA/FASTTK CNTRL SOLN) SOLN USE AS DIRECTED 1 each 11    ONE TOUCH ULTRA TEST strip USE AS DIRECTED FOR TESTING BLOOD SUGARS DAILY  15    glucose (GLUTOSE) 40 % GEL Take 15 g by mouth Use as directed      fluticasone (FLONASE) 50 MCG/ACT nasal spray USE 1 SPRAY IN EACH NOSTRIL TWICE DAILY FOR SEASONAL ALLERGIES *SHAKE GENTLY* 16 g 11    polyethylene glycol (GLYCOLAX) powder Take 17 g by mouth daily as needed (constipation) Dissolve in 8 ounces of water       No current facility-administered medications for this visit. Return in about 1 month (around 10/18/2020). An electronic signature was used to authenticate this note.     --Vinnie Moody PA-C on 9/18/2020 at 11:28 AM

## 2020-09-21 ENCOUNTER — HOSPITAL ENCOUNTER (OUTPATIENT)
Age: 75
Discharge: HOME | End: 2020-09-23
Payer: COMMERCIAL

## 2020-09-21 LAB
ALBUMIN SERPL-MCNC: 3.7 G/DL (ref 3.5–5.2)
ALP BLD-CCNC: 96 U/L (ref 35–104)
ALT SERPL-CCNC: 12 U/L (ref 0–32)
ANION GAP SERPL CALCULATED.3IONS-SCNC: 16 MMOL/L (ref 7–16)
AST SERPL-CCNC: 13 U/L (ref 0–31)
BASOPHILS ABSOLUTE: 0 E9/L (ref 0–0.2)
BASOPHILS RELATIVE PERCENT: 0 % (ref 0–2)
BILIRUB SERPL-MCNC: <0.2 MG/DL (ref 0–1.2)
BUN BLDV-MCNC: 31 MG/DL (ref 8–23)
CALCIUM SERPL-MCNC: 9.4 MG/DL (ref 8.6–10.2)
CHLORIDE BLD-SCNC: 103 MMOL/L (ref 98–107)
CHOLESTEROL, TOTAL: 142 MG/DL (ref 0–199)
CO2: 20 MMOL/L (ref 22–29)
CREAT SERPL-MCNC: 1.4 MG/DL (ref 0.5–1)
EOSINOPHILS ABSOLUTE: 0.01 E9/L (ref 0.05–0.5)
EOSINOPHILS RELATIVE PERCENT: 0.2 % (ref 0–6)
GFR AFRICAN AMERICAN: 44
GFR NON-AFRICAN AMERICAN: 44 ML/MIN/1.73
GLUCOSE BLD-MCNC: 179 MG/DL (ref 74–99)
HBA1C MFR BLD: 6.4 % (ref 4–5.6)
HCT VFR BLD CALC: 33.9 % (ref 34–48)
HDLC SERPL-MCNC: 44 MG/DL
HEMOGLOBIN: 10.2 G/DL (ref 11.5–15.5)
IMMATURE GRANULOCYTES #: 0.02 E9/L
IMMATURE GRANULOCYTES %: 0.4 % (ref 0–5)
LDL CHOLESTEROL CALCULATED: 65 MG/DL (ref 0–99)
LYMPHOCYTES ABSOLUTE: 1.64 E9/L (ref 1.5–4)
LYMPHOCYTES RELATIVE PERCENT: 32.3 % (ref 20–42)
MCH RBC QN AUTO: 24.8 PG (ref 26–35)
MCHC RBC AUTO-ENTMCNC: 30.1 % (ref 32–34.5)
MCV RBC AUTO: 82.3 FL (ref 80–99.9)
MONOCYTES ABSOLUTE: 0.65 E9/L (ref 0.1–0.95)
MONOCYTES RELATIVE PERCENT: 12.8 % (ref 2–12)
NEUTROPHILS ABSOLUTE: 2.75 E9/L (ref 1.8–7.3)
NEUTROPHILS RELATIVE PERCENT: 54.3 % (ref 43–80)
PDW BLD-RTO: 13.3 FL (ref 11.5–15)
PLATELET # BLD: 258 E9/L (ref 130–450)
PMV BLD AUTO: 11.3 FL (ref 7–12)
POTASSIUM SERPL-SCNC: 4.1 MMOL/L (ref 3.5–5)
RBC # BLD: 4.12 E12/L (ref 3.5–5.5)
SODIUM BLD-SCNC: 139 MMOL/L (ref 132–146)
TOTAL PROTEIN: 6.5 G/DL (ref 6.4–8.3)
TRIGL SERPL-MCNC: 163 MG/DL (ref 0–149)
TSH SERPL DL<=0.05 MIU/L-ACNC: <0.01 UIU/ML (ref 0.27–4.2)
VLDLC SERPL CALC-MCNC: 33 MG/DL
WBC # BLD: 5.1 E9/L (ref 4.5–11.5)

## 2020-09-21 PROCEDURE — 83036 HEMOGLOBIN GLYCOSYLATED A1C: CPT

## 2020-09-21 PROCEDURE — 80053 COMPREHEN METABOLIC PANEL: CPT

## 2020-09-21 PROCEDURE — 36415 COLL VENOUS BLD VENIPUNCTURE: CPT

## 2020-09-21 PROCEDURE — 80061 LIPID PANEL: CPT

## 2020-09-21 PROCEDURE — 85025 COMPLETE CBC W/AUTO DIFF WBC: CPT

## 2020-09-21 PROCEDURE — 84443 ASSAY THYROID STIM HORMONE: CPT

## 2020-10-16 ENCOUNTER — OFFICE VISIT (OUTPATIENT)
Dept: PRIMARY CARE CLINIC | Age: 75
End: 2020-10-16
Payer: COMMERCIAL

## 2020-10-16 VITALS
BODY MASS INDEX: 32.47 KG/M2 | RESPIRATION RATE: 18 BRPM | DIASTOLIC BLOOD PRESSURE: 72 MMHG | TEMPERATURE: 97.5 F | HEIGHT: 66 IN | OXYGEN SATURATION: 98 % | WEIGHT: 202 LBS | SYSTOLIC BLOOD PRESSURE: 143 MMHG | HEART RATE: 62 BPM

## 2020-10-16 PROCEDURE — G0439 PPPS, SUBSEQ VISIT: HCPCS | Performed by: PHYSICIAN ASSISTANT

## 2020-10-16 PROCEDURE — G0008 ADMIN INFLUENZA VIRUS VAC: HCPCS | Performed by: PHYSICIAN ASSISTANT

## 2020-10-16 PROCEDURE — 1123F ACP DISCUSS/DSCN MKR DOCD: CPT | Performed by: PHYSICIAN ASSISTANT

## 2020-10-16 PROCEDURE — 4040F PNEUMOC VAC/ADMIN/RCVD: CPT | Performed by: PHYSICIAN ASSISTANT

## 2020-10-16 PROCEDURE — G8417 CALC BMI ABV UP PARAM F/U: HCPCS | Performed by: PHYSICIAN ASSISTANT

## 2020-10-16 PROCEDURE — G8484 FLU IMMUNIZE NO ADMIN: HCPCS | Performed by: PHYSICIAN ASSISTANT

## 2020-10-16 PROCEDURE — 1090F PRES/ABSN URINE INCON ASSESS: CPT | Performed by: PHYSICIAN ASSISTANT

## 2020-10-16 PROCEDURE — 90694 VACC AIIV4 NO PRSRV 0.5ML IM: CPT | Performed by: PHYSICIAN ASSISTANT

## 2020-10-16 ASSESSMENT — PATIENT HEALTH QUESTIONNAIRE - PHQ9
SUM OF ALL RESPONSES TO PHQ QUESTIONS 1-9: 2
SUM OF ALL RESPONSES TO PHQ9 QUESTIONS 1 & 2: 2
2. FEELING DOWN, DEPRESSED OR HOPELESS: 1
1. LITTLE INTEREST OR PLEASURE IN DOING THINGS: 1

## 2020-10-16 ASSESSMENT — LIFESTYLE VARIABLES: HOW OFTEN DO YOU HAVE A DRINK CONTAINING ALCOHOL: 0

## 2020-10-16 NOTE — PROGRESS NOTES
Medicare Annual Wellness Visit  Name: Elizabeth Ken Date: 10/16/2020   MRN: [id number] Sex: Female   Age: 66 y.o. Ethnicity: Non-/Non    : 1945 Race: Black      Wesly Browning is here for Medicare AWV    Screenings for behavioral, psychosocial and functional/safety risks, and cognitive dysfunction are all negative except as indicated below. These results, as well as other patient data from the [address] form, are documented in Flowsheets linked to this Encounter. Allergies   Allergen Reactions    Nicotine Polacrilex Rash         Prior to Visit Medications    Medication Sig Taking? Authorizing Provider   Multiple Vitamin (DAILY-MARYLIN) TABS TAKE 1 TABLET BY MOUTH DAILY  Alex Whittington MD   acetaminophen (NON-ASPIRIN PAIN RELIEF) 325 MG tablet Take 2 tablets by mouth daily  Alex Whittington MD   loratadine (CLARITIN) 10 MG tablet TAKE ONE(1) TABLET BY MOUTH ONCE DAILY  Alex Whittington MD   SYSTANE 0.4-0.3 % ophthalmic solution INSTILL 1 DROP INTO AFFECTED EYE(S) TWICE DAILY  Alxe Whittington MD   diclofenac sodium (VOLTAREN) 1 % GEL APPLY TOPICALLY TO UPPER JOINT (KNEES) THREE(3) TIMES DAILY **RE-ORDER ACCORDINGLY**  Alex Whittington MD   metFORMIN (GLUCOPHAGE) 500 MG tablet TAKE 1 TABLET BY MOUTH TWICE DAILY  Alex Whittington MD    MG capsule TAKE 1 CAPSULE BY MOUTH TWICE DAILY  Alex Whittington MD   vitamin D (ERGOCALCIFEROL) 1.25 MG (81076 UT) CAPS capsule TAKE 1 CAPSULE BY MOUTH ONCE EVERY OTHER WEEK ON SATURDAY **RE-ORDER ACCORDINGLY**  Alex Whittington MD   lisinopril-hydroCHLOROthiazide (PRINZIDE;ZESTORETIC) 20-12.5 MG per tablet TAKE 1 TABLET BY MOUTH TWICE DAILY  Alex Whittington MD   PARoxetine (PAXIL) 40 MG tablet Take 1 tablet by mouth daily  Alex Whittington MD   SPIRIVA HANDIHALER 18 MCG inhalation capsule INHALE CONTENTS OF 1 CAP VIA HANDIHALER ONCE DAILY. *CAPS SHOULD BE STORED IN BLISTER & ONLY REMOVED BEFORE USE* RINSE MOUTH WITH WATER AFTER  Alex Whittington MD   ondansetron (ZOFRAN-ODT) 8 MG TBDP disintegrating tablet TAKE 1 TABLET SL EVERY 6 HOURS AS NEEDED  Isaiah Evans MD   amLODIPine (NORVASC) 10 MG tablet Take 1 tablet by mouth daily  Isaiah Evans MD   aspirin (ASPIRIN LOW DOSE) 81 MG EC tablet Take 1 tablet by mouth daily Do not crush or break. Isaiah Evans MD   ASSURE COMFORT LANCETS 28G MISC CHECK GLUCOSE ONCE DAILY IN AM CALL  IF > 250  Isaiah Evans MD   hydrALAZINE (APRESOLINE) 100 MG tablet TAKE 1 TABLET BY MOUTH THREE TIMES DAILY FOR HTN.   Isaiah Evans MD   GLUCAGEN HYPOKIT 1 MG SOLR injection USE 1MG IM FOR GLUCOSE BELOW 60 AND RECHECK IN 15 MINUTES  Isaiah Evans MD   Blood Glucose Calibration (RIGHTEST CONTROL) High LIQD USE AS DIRECTED  Isaiah Evans MD   Blood Glucose Calibration (OT ULTRA/FASTTK CNTRL SOLN) SOLN USE AS DIRECTED  Isaiah Evans MD   ONE TOUCH ULTRA TEST strip USE AS DIRECTED FOR TESTING BLOOD SUGARS DAILY  Historical Provider, MD   glucose (GLUTOSE) 40 % GEL Take 15 g by mouth Use as directed  Historical Provider, MD   fluticasone (FLONASE) 50 MCG/ACT nasal spray USE 1 SPRAY IN EACH NOSTRIL TWICE DAILY FOR SEASONAL ALLERGIES *SHAKE GENTLY*  Isaiah Evans MD   polyethylene glycol (GLYCOLAX) powder Take 17 g by mouth daily as needed (constipation) Dissolve in 8 ounces of Vick Richardson MD         Past Medical History:   Diagnosis Date    Hypertension     since 1993    Type II or unspecified type diabetes mellitus without mention of complication, not stated as uncontrolled     since 1990       Past Surgical History:   Procedure Laterality Date    ANKLE SURGERY  1980's    torn ligament    APPENDECTOMY      ECHO COMPL W DOP COLOR FLOW  11/18/2011              Family History   Problem Relation Age of Onset    Cancer Sister         unknown       CareTeam (Including outside providers/suppliers regularly involved in providing care):   Patient Care Team:  Isaiah Evans MD as PCP - General  Isaiah Evans MD as PCP - [facility] Empaneled Provider    Wt Readings from Last 3 Encounters:   10/16/20 202 lb (91.6 kg)   09/18/20 202 lb 6.4 oz (91.8 kg)   08/21/20 204 lb 3.2 oz (92.6 kg)     There were no vitals filed for this visit. There is no height or weight on file to calculate BMI. Based upon direct observation of the patient, evaluation of cognition reveals recent and remote memory intact. Patient's complete Health Risk Assessment and screening values have been reviewed and are found in Flowsheets. The following problems were reviewed today and where indicated follow up appointments were made and/or referrals ordered. Positive Risk Factor Screenings with Interventions:     Health Habits/Nutrition:  Health Habits/Nutrition  Do you exercise for at least 20 minutes 2-3 times per week?: Yes  Have you lost any weight without trying in the past 3 months?: No  Do you eat fewer than 2 meals per day?: No  Have you seen a dentist within the past year?: (!) No     Health Habits/Nutrition Interventions:  · Dental exam overdue:  patient encouraged to make appointment with his/her dentist    Hearing/Vision:  No exam data present  Hearing/Vision  Do you or your family notice any trouble with your hearing?: No  Do you have difficulty driving, watching TV, or doing any of your daily activities because of your eyesight?: No  Have you had an eye exam within the past year?: (!) No  Hearing/Vision Interventions:  · Vision concerns:  patient encouraged to make appointment with his/her eye specialist    ADL:  ADLs  In the past 7 days, did you need help from others to perform any of the following everyday activities? Eating, dressing, grooming, bathing, toileting, or walking/balance?: None  In the past 7 days, did you need help from others to take care of any of the following?  Laundry, housekeeping, banking/finances, shopping, telephone use, food preparation, transportation, or taking medications?: [employer], Housekeeping, [employer], Shopping, Food Preparation, Transportation  ADL Interventions:  · Patient declines any further

## 2020-10-16 NOTE — PROGRESS NOTES
10/16/2020     Home visit medically necessary in lieu of an office visit due to: [facility] resident, dementia, uses walker, difficult to get out. HPI:  Patient says she is feeling well today. Walking in halls more now and is attending [facility] activities. Chronic arthritic knee pain continues - takes Tylenol and has Voltaren oinment  TID> She has had no swelling of LEs. She has had no recent falls. She has had no fever, chills, cough symptoms. No CP, SOB or XIAO. Health shake is prn. She is moving bowels almost every day. No urinary symptoms. Labs reviewed. She has been in good spirits most the time and she is sleeping well. Wants mammogram when available. Agrees to DEXA scan. Patient wants flu shot today. REVIEW OF SYSTEMS:    GENERAL: Appetite good. Recent weight gain, generally healthy, no change in strength or exercise tolerance. CARDIOVASCULAR: No edema, no chest pains, no murmurs, no palpitations, no syncope, no orthopnea. RESPIRATORY: No shortness of breath, no pain with breathing, no wheezing. OCCAS COUGH TO CLEAR THROAT. GASTROINTESTINAL: No change in appetite, no dysphagia, no heartburn, no constipation or diarrhea, no bowel habit changes, no emesis, no melena, no hemorrhoids. GENITOURINARY: No incontinence or retention, no urinary urgency, no nocturia, no frequent UTIs, no dysuria, no change in nature of urine. MUSCULOSKELETAL: No swelling or redness of joints, no limitation of range of motion, no weakness or numbness. PAINS IN KNEES/LEGS. NEURO/PSYCH: No weakness, no tremor, no seizures,  no ataxia. No changes in sleep habits, no changes in thought content. CONFUSION AT TIMES, MEMORY LOSS. DEPRESSION/ANXIETY. All other systems negative. PAST MEDICAL HISTORY:  (Major events, hospitalizations, surgeries):  Appendectomy. Known allergies: NKA. Ongoing medical problems: T2DM, HTN, COPD, OA, Depressive disorder, anxiety, dementia. Family medical history: Non-contributory.   Preventative: Flu vac - 10/2019. Pneumovax - 2019. Last eye exam - 2012. Last dental exam - 2009. 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 Completed 8th grade. DNR-CC. PHYSICAL EXAM:      Vitals:    10/16/20 0939   BP: (!) 143/72   Pulse: 62   Resp: 18   Temp: 97.5 °F (36.4 °C)   TempSrc: Temporal   SpO2: 98%   Weight: 202 lb (91.6 kg)   Height: 5' 6\" (1.676 m)        GEN:  elderly  WDWN female patient laying in bed in NAD. HEAD:  atraumatic, normocephalic. EYES:  EOMI, PERRL, no cataracts, conjunctivae appear normal.  ENT:   Good hearing, EACs without wax, TM's normal, nasal septum midline, no significant congestion, oral cavity without lesions, edentulous no dentures. ABD:  Well healed scar. Non tender to palp, no palp masses or HSM, normal BS. LUNGS: Clear to auscultation. No wheezes, no rhonchi or rales. HEART:  RRR, no murmurs, or gallops  BACK:  no scoliosis or kyphosis, non-tender to palp. EXTREMITIES: No pedal edema, no ulcerations, varicosities or erythema. No gross deformities. MUSCULOSKELETAL: Decreased ROM of knee joints due to pain and stiffness. Otherwise, joints are non-tender with full ROM. SKIN:  No ulcerations or breakdown, rash, ecchymosis, or other lesions. NEURO:   No tremor, motor UEs 5/5 LEs  5/5, sensory normal, able to stand with extra effort, walks very slowly with mild-mod ataxia. PSYCH:  Pleasant and cooperative. Fluid  slow speech, oriented to person only. No delusional statements. Remembered 2/3 items. Medications reviewed.   Labs: 9/21/20 HH 10.2/33.9, GFR 44, Bun/Creat 31/1.4, A1c 6.4, LFT nl, TSH <0.010, , Tri 163, HDL 44, LDL 65  3/17/20 A1c 6.5, GFR 58, Bun/Creat 24/1.1, lytes nml  12/23/19 Mammo neg  12/6/19  Glu 63, GFR 53, Prot 5.8, A1c 5.8, H/H 10.3/33.1, ALB 3.4, UA CX neg  9/29/19 UA cx neg  9/3/19 WBC 4.4, HH 10.7/33.7, A1c 6.1, TSH 0.002, Free T4 1.29, lytes nml, Bun 34, Creat 1.4, GFR 37, education/counseling regarding cardiac, DJD, T2DM and HTN disease processes, treatment options, meds and coordination of care with [facility] and pharmacy. Current Outpatient Medications   Medication Sig Dispense Refill    Multiple Vitamin (DAILY-MARYLIN) TABS TAKE 1 TABLET BY MOUTH DAILY 30 tablet 10    acetaminophen (NON-ASPIRIN PAIN RELIEF) 325 MG tablet Take 2 tablets by mouth daily 60 tablet 11    loratadine (CLARITIN) 10 MG tablet TAKE ONE(1) TABLET BY MOUTH ONCE DAILY 31 tablet 10    SYSTANE 0.4-0.3 % ophthalmic solution INSTILL 1 DROP INTO AFFECTED EYE(S) TWICE DAILY 15 mL 11    diclofenac sodium (VOLTAREN) 1 % GEL APPLY TOPICALLY TO UPPER JOINT (KNEES) THREE(3) TIMES DAILY **RE-ORDER ACCORDINGLY** 100 g 10    metFORMIN (GLUCOPHAGE) 500 MG tablet TAKE 1 TABLET BY MOUTH TWICE DAILY 62 tablet 11     MG capsule TAKE 1 CAPSULE BY MOUTH TWICE DAILY 62 capsule 11    vitamin D (ERGOCALCIFEROL) 1.25 MG (80721 UT) CAPS capsule TAKE 1 CAPSULE BY MOUTH ONCE EVERY OTHER WEEK ON SATURDAY **RE-ORDER ACCORDINGLY** 2 capsule 10    lisinopril-hydroCHLOROthiazide (PRINZIDE;ZESTORETIC) 20-12.5 MG per tablet TAKE 1 TABLET BY MOUTH TWICE DAILY 60 tablet 10    PARoxetine (PAXIL) 40 MG tablet Take 1 tablet by mouth daily 30 tablet 11    SPIRIVA HANDIHALER 18 MCG inhalation capsule INHALE CONTENTS OF 1 CAP VIA HANDIHALER ONCE DAILY. *CAPS SHOULD BE STORED IN BLISTER & ONLY REMOVED BEFORE USE* RINSE MOUTH WITH WATER AFTER 30 capsule 11    ondansetron (ZOFRAN-ODT) 8 MG TBDP disintegrating tablet TAKE 1 TABLET SL EVERY 6 HOURS AS NEEDED 1 tablet 0    amLODIPine (NORVASC) 10 MG tablet Take 1 tablet by mouth daily 30 tablet 11    aspirin (ASPIRIN LOW DOSE) 81 MG EC tablet Take 1 tablet by mouth daily Do not crush or break. 30 tablet 11    ASSURE COMFORT LANCETS 28G MISC CHECK GLUCOSE ONCE DAILY IN AM CALL  IF > 250 100 each 11    hydrALAZINE (APRESOLINE) 100 MG tablet TAKE 1 TABLET BY MOUTH THREE TIMES DAILY FOR HTN. 93 tablet 10    GLUCAGEN HYPOKIT 1 MG SOLR injection USE 1MG IM FOR GLUCOSE BELOW 60 AND RECHECK IN 15 MINUTES 1 mg 10    Blood Glucose Calibration (RIGHTEST CONTROL) High LIQD USE AS DIRECTED 1 each 11    Blood Glucose Calibration (OT ULTRA/FASTTK CNTRL SOLN) SOLN USE AS DIRECTED 1 each 11    ONE TOUCH ULTRA TEST strip USE AS DIRECTED FOR TESTING BLOOD SUGARS DAILY  15    glucose (GLUTOSE) 40 % GEL Take 15 g by mouth Use as directed      fluticasone (FLONASE) 50 MCG/ACT nasal spray USE 1 SPRAY IN EACH NOSTRIL TWICE DAILY FOR SEASONAL ALLERGIES *SHAKE GENTLY* 16 g 11    polyethylene glycol (GLYCOLAX) powder Take 17 g by mouth daily as needed (constipation) Dissolve in 8 ounces of water       No current facility-administered medications for this visit. Return in about 1 month (around 11/16/2020). An electronic signature was used to authenticate this note.     --Ozzy Vasquez PA-C on 10/16/2020 at 2:27 PM

## 2020-10-16 NOTE — PATIENT INSTRUCTIONS
Personalized Preventive Plan for Katty Goodwin - 10/16/2020  Medicare offers a range of preventive health benefits. Some of the tests and screenings are paid in full while other may be subject to a deductible, co-insurance, and/or copay. Some of these benefits include a comprehensive review of your medical history including lifestyle, illnesses that may run in your family, and various assessments and screenings as appropriate. After reviewing your medical record and screening and assessments performed today your provider may have ordered immunizations, labs, imaging, and/or referrals for you. A list of these orders (if applicable) as well as your Preventive Care list are included within your After Visit Summary for your review. Other Preventive Recommendations:    · A preventive eye exam performed by an eye specialist is recommended every 1-2 years to screen for glaucoma; cataracts, macular degeneration, and other eye disorders. · A preventive dental visit is recommended every 6 months. · Try to get at least 150 minutes of exercise per week or 10,000 steps per day on a pedometer . · Order or download the FREE \"Exercise & Physical Activity: Your Everyday Guide\" from [employer]. Call 9-695.414.6364 or search [employer] online. · You need 6157-7460 mg of calcium and 1140-5125 IU of vitamin D per day. It is possible to meet your calcium requirement with diet alone, but a vitamin D supplement is usually necessary to meet this goal.  · When exposed to the sun, use a sunscreen that protects against both UVA and UVB radiation with an SPF of 30 or greater. Reapply every 2 to 3 hours or after sweating, drying off with a towel, or swimming. · Always wear a seat belt when traveling in a car. Always wear a helmet when riding a bicycle or motorcycle.

## 2020-10-19 ENCOUNTER — HOSPITAL ENCOUNTER (OUTPATIENT)
Age: 75
Discharge: HOME | End: 2020-10-21
Payer: COMMERCIAL

## 2020-10-19 LAB
T3 TOTAL: 102.7 NG/DL (ref 80–200)
T4 FREE: 1.61 NG/DL (ref 0.93–1.7)
TSH SERPL DL<=0.05 MIU/L-ACNC: <0.01 UIU/ML (ref 0.27–4.2)

## 2020-10-19 PROCEDURE — 84439 ASSAY OF FREE THYROXINE: CPT

## 2020-10-19 PROCEDURE — 84480 ASSAY TRIIODOTHYRONINE (T3): CPT

## 2020-10-19 PROCEDURE — 84443 ASSAY THYROID STIM HORMONE: CPT

## 2020-10-19 PROCEDURE — 36415 COLL VENOUS BLD VENIPUNCTURE: CPT

## 2020-10-28 ENCOUNTER — TELEPHONE (OUTPATIENT)
Dept: PRIMARY CARE CLINIC | Age: 75
End: 2020-10-28

## 2020-10-28 NOTE — TELEPHONE ENCOUNTER
Fax from [facility]. They are asking that you add an addendum to your note from 10/16/20 stating Rose Joseph needs PT d/t knee pain and need for gait, balance training and strenghtening

## 2020-11-13 ENCOUNTER — OFFICE VISIT (OUTPATIENT)
Dept: PRIMARY CARE CLINIC | Age: 75
End: 2020-11-13
Payer: COMMERCIAL

## 2020-11-13 VITALS
OXYGEN SATURATION: 95 % | TEMPERATURE: 98.4 F | BODY MASS INDEX: 32.43 KG/M2 | HEIGHT: 66 IN | WEIGHT: 201.8 LBS | HEART RATE: 74 BPM | SYSTOLIC BLOOD PRESSURE: 127 MMHG | RESPIRATION RATE: 18 BRPM | DIASTOLIC BLOOD PRESSURE: 57 MMHG

## 2020-11-13 PROCEDURE — 1090F PRES/ABSN URINE INCON ASSESS: CPT | Performed by: PHYSICIAN ASSISTANT

## 2020-11-13 PROCEDURE — 1123F ACP DISCUSS/DSCN MKR DOCD: CPT | Performed by: PHYSICIAN ASSISTANT

## 2020-11-13 PROCEDURE — G8417 CALC BMI ABV UP PARAM F/U: HCPCS | Performed by: PHYSICIAN ASSISTANT

## 2020-11-13 PROCEDURE — G8484 FLU IMMUNIZE NO ADMIN: HCPCS | Performed by: PHYSICIAN ASSISTANT

## 2020-11-13 NOTE — PROGRESS NOTES
11/13/2020     Home visit medically necessary in lieu of an office visit due to: [facility] resident, dementia, uses walker, difficult to get out. HPI:  Started back up with PT to improve strength and gait. Chronic arthritic knee pain continues - takes Tylenol and has Voltaren ointment. She has had no swelling of LEs. She has had no recent falls. She has had no fever, chills, cough symptoms. No CP, SOB or XIAO. Health shake is prn. She is moving bowels almost every day. No urinary symptoms. She has been in good spirits most the time and she is sleeping well. Denies adverse reaction to flu shot given recently. REVIEW OF SYSTEMS:    GENERAL: Appetite good. Recent weight gain, generally healthy, no change in strength or exercise tolerance. CARDIOVASCULAR: No edema, no chest pains, no murmurs, no palpitations, no syncope, no orthopnea. RESPIRATORY: No shortness of breath, no pain with breathing, no wheezing. OCCAS COUGH TO CLEAR THROAT. GASTROINTESTINAL: No change in appetite, no dysphagia, no heartburn, no constipation or diarrhea, no bowel habit changes, no emesis, no melena, no hemorrhoids. GENITOURINARY: No incontinence or retention, no urinary urgency, no nocturia, no frequent UTIs, no dysuria, no change in nature of urine. MUSCULOSKELETAL: No swelling or redness of joints, no limitation of range of motion, no weakness or numbness. PAINS IN KNEES/LEGS. NEURO/PSYCH: No weakness, no tremor, no seizures,  no ataxia. No changes in sleep habits, no changes in thought content. CONFUSION AT TIMES, MEMORY LOSS. DEPRESSION/ANXIETY. All other systems negative. PAST MEDICAL HISTORY:  (Major events, hospitalizations, surgeries):  Appendectomy. Known allergies: NKA. Ongoing medical problems: T2DM, HTN, COPD, OA, Depressive disorder, anxiety, dementia. Family medical history: Non-contributory. Preventative: Flu vac - 10/2020. Pneumovax - 2019. Last eye exam - 2012. Last dental exam - 2009.   Social history: . 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 Completed 8th grade. DNR-CC. PHYSICAL EXAM:      Vitals:    11/13/20 0907   BP: (!) 127/57   Pulse: 74   Resp: 18   Temp: 98.4 °F (36.9 °C)   TempSrc: Temporal   SpO2: 95%   Weight: 201 lb 12.8 oz (91.5 kg)   Height: 5' 6\" (1.676 m)        GEN:  elderly  WDWN female patient laying in bed in NAD. HEAD:  atraumatic, normocephalic. EYES:  EOMI, PERRL, no cataracts, conjunctivae appear normal.  ENT:   Good hearing, EACs without wax, TM's normal, nasal septum midline, no significant congestion, oral cavity without lesions, edentulous no dentures. ABD:  Well healed scar. Non tender to palp, no palp masses or HSM, normal BS. LUNGS: Clear to auscultation. No wheezes, no rhonchi or rales. HEART:  RRR, no murmurs, or gallops  BACK:  no scoliosis or kyphosis, non-tender to palp. EXTREMITIES: No pedal edema, no ulcerations, varicosities or erythema. No gross deformities. MUSCULOSKELETAL: Decreased ROM of knee joints due to pain and stiffness. Otherwise, joints are non-tender with full ROM. SKIN:  No ulcerations or breakdown, rash, ecchymosis, or other lesions. NEURO:   No tremor, motor UEs 5/5 LEs  5/5, sensory normal, able to stand with extra effort, walks very slowly with mild-mod ataxia. PSYCH:  Pleasant and cooperative. Fluid  slow speech, oriented to person only. No delusional statements. Remembered 2/3 items. Medications reviewed.   Labs: 10/19/20 TSH <0.010, T3 102.70, Free T4 1.61  9/21/20 HH 10.2/33.9, GFR 44, Bun/Creat 31/1.4, A1c 6.4, LFT nl, TSH <0.010, , Tri 163, HDL 44, LDL 65  3/17/20 A1c 6.5, GFR 58, Bun/Creat 24/1.1, lytes nml  12/23/19 Mammo neg  12/6/19  Glu 63, GFR 53, Prot 5.8, A1c 5.8, H/H 10.3/33.1, ALB 3.4, UA CX neg  9/29/19 UA cx neg  9/3/19 WBC 4.4, HH 10.7/33.7, A1c 6.1, TSH 0.002, Free T4 1.29, lytes nml, Bun 34, Creat 1.4, GFR 37, Glu 84, hepatic nml ASSESSMENT:  Elderly female with has CKD (chronic kidney disease) stage 3, GFR 30-59 ml/min; Essential hypertension; DM (diabetes mellitus) type II controlled with renal manifestation ([address] Insomnia, unspecified; Generalized osteoarthrosis, involving multiple sites; Obstructive chronic bronchitis without exacerbation ([address] Personal history of fall; Vascular dementia, uncomplicated ([facility] Other constipation; Difficulty walking; Agitation; Anxiety; and Obesity (BMI 30.0-34.9) on their problem list.     Diagnoses and all orders for this visit:    Vascular dementia, uncomplicated ([facility] [address]    Essential hypertension    Controlled type 2 diabetes mellitus with stage 3 chronic kidney disease, without long-term current use of insulin ([facility])    Stage 3a chronic kidney disease    Generalized osteoarthrosis, involving multiple sites    Difficulty walking         PLAN:  Continue PT due to knee pain to improve gait, balance training and strengthening. Discussed diet and exercise. Encouraged to keep up with fluid intake. Continue Voltaren gel to 4gms TID to knees. Follow up with Dr. Bhumi Olivares (ortho) on regular basis. Check A1c and BMP every 3 months, next in December. Mammogram and DEXA scan after 12/24/20  Continue other meds as per Rx List.  Recheck 1 month. 40 minutes spent on visit, 25 minutes involved education/counseling regarding cardiac, DJD, T2DM and HTN disease processes, treatment options, meds and coordination of care with skilled nursing and pharmacy.      Current Outpatient Medications   Medication Sig Dispense Refill    fluticasone (FLONASE) 50 MCG/ACT nasal spray USE 1 SPRAY IN EACH NOSTRIL TWICE DAILY FOR SEASONAL ALLERGIES *SHAKE GENTLY* 16 g 11    Multiple Vitamin (DAILY-MARYLIN) TABS TAKE 1 TABLET BY MOUTH DAILY 30 tablet 10    acetaminophen (NON-ASPIRIN PAIN RELIEF) 325 MG tablet Take 2 tablets by mouth daily 60 tablet 11    loratadine (CLARITIN) 10 MG tablet TAKE ONE(1) TABLET BY MOUTH ONCE DAILY 31 tablet 10    SYSTANE 0.4-0.3 % ophthalmic solution INSTILL 1 DROP INTO AFFECTED EYE(S) TWICE DAILY 15 mL 11    diclofenac sodium (VOLTAREN) 1 % GEL APPLY TOPICALLY TO UPPER JOINT (KNEES) THREE(3) TIMES DAILY **RE-ORDER ACCORDINGLY** 100 g 10    metFORMIN (GLUCOPHAGE) 500 MG tablet TAKE 1 TABLET BY MOUTH TWICE DAILY 62 tablet 11     MG capsule TAKE 1 CAPSULE BY MOUTH TWICE DAILY 62 capsule 11    vitamin D (ERGOCALCIFEROL) 1.25 MG (44996 UT) CAPS capsule TAKE 1 CAPSULE BY MOUTH ONCE EVERY OTHER WEEK ON SATURDAY **RE-ORDER ACCORDINGLY** 2 capsule 10    lisinopril-hydroCHLOROthiazide (PRINZIDE;ZESTORETIC) 20-12.5 MG per tablet TAKE 1 TABLET BY MOUTH TWICE DAILY 60 tablet 10    PARoxetine (PAXIL) 40 MG tablet Take 1 tablet by mouth daily 30 tablet 11    SPIRIVA HANDIHALER 18 MCG inhalation capsule INHALE CONTENTS OF 1 CAP VIA HANDIHALER ONCE DAILY. *CAPS SHOULD BE STORED IN BLISTER & ONLY REMOVED BEFORE USE* RINSE MOUTH WITH WATER AFTER 30 capsule 11    ondansetron (ZOFRAN-ODT) 8 MG TBDP disintegrating tablet TAKE 1 TABLET SL EVERY 6 HOURS AS NEEDED 1 tablet 0    amLODIPine (NORVASC) 10 MG tablet Take 1 tablet by mouth daily 30 tablet 11    aspirin (ASPIRIN LOW DOSE) 81 MG EC tablet Take 1 tablet by mouth daily Do not crush or break. 30 tablet 11    ASSURE COMFORT LANCETS 28G MISC CHECK GLUCOSE ONCE DAILY IN AM CALL DRCalli IF > 250 100 each 11    hydrALAZINE (APRESOLINE) 100 MG tablet TAKE 1 TABLET BY MOUTH THREE TIMES DAILY FOR HTN.  93 tablet 10    GLUCAGEN HYPOKIT 1 MG SOLR injection USE 1MG IM FOR GLUCOSE BELOW 60 AND RECHECK IN 15 MINUTES 1 mg 10    Blood Glucose Calibration (RIGHTEST CONTROL) High LIQD USE AS DIRECTED 1 each 11    Blood Glucose Calibration (OT ULTRA/FASTTK CNTRL SOLN) SOLN USE AS DIRECTED 1 each 11    ONE TOUCH ULTRA TEST strip USE AS DIRECTED FOR TESTING BLOOD SUGARS DAILY  15    glucose (GLUTOSE) 40 % GEL Take 15 g by mouth Use as directed      polyethylene glycol (GLYCOLAX) powder Take 17 g by mouth daily as needed (constipation) Dissolve in 8 ounces of water       No current facility-administered medications for this visit. Return in about 1 month (around 12/13/2020). An electronic signature was used to authenticate this note.     --Augustine Clark PA-C on 11/13/2020 at 9:40 AM

## 2020-11-17 LAB
SARS-COV-2: NOT DETECTED
SOURCE: NORMAL

## 2020-12-02 LAB
SARS-COV-2: NOT DETECTED
SOURCE: NORMAL

## 2020-12-04 LAB
BILIRUBIN URINE: NEGATIVE
BLOOD, URINE: NEGATIVE
CLARITY: CLEAR
COLOR: YELLOW
GLUCOSE URINE: NEGATIVE MG/DL
KETONES, URINE: NEGATIVE MG/DL
LEUKOCYTE ESTERASE, URINE: NEGATIVE
NITRITE, URINE: NEGATIVE
PH UA: 6.5 (ref 5–9)
PROTEIN UA: NEGATIVE MG/DL
SPECIFIC GRAVITY UA: 1.01 (ref 1–1.03)
UROBILINOGEN, URINE: 0.2 E.U./DL

## 2020-12-06 LAB — URINE CULTURE, ROUTINE: NORMAL

## 2020-12-13 NOTE — PROGRESS NOTES
12/14/2020     Home visit medically necessary in lieu of an office visit due to: long term resident, dementia, uses walker, difficult to get out. HPI:  Patient says she is doing good. She feels the PT has helped to improve strength and gait. She still has chronic arthritic knee pain and takes Tylenol and has Voltaren ointment. She has had no swelling of LEs. She has had no recent falls. She has had no fever, chills, cough symptoms. No CP, SOB or XIAO. Health shake is prn. She is moving bowels almost every day. No urinary symptoms. She has been in good spirits most the time and she is sleeping well. Denies adverse reaction to flu shot given recently. REVIEW OF SYSTEMS:    GENERAL: Appetite good. Recent weight gain, generally healthy, no change in strength or exercise tolerance. CARDIOVASCULAR: No edema, no chest pains, no murmurs, no palpitations, no syncope, no orthopnea. RESPIRATORY: No shortness of breath, no pain with breathing, no wheezing. OCCAS COUGH TO CLEAR THROAT. GASTROINTESTINAL: No change in appetite, no dysphagia, no heartburn, no constipation or diarrhea, no bowel habit changes, no emesis, no melena, no hemorrhoids. GENITOURINARY: No incontinence or retention, no urinary urgency, no nocturia, no frequent UTIs, no dysuria, no change in nature of urine. MUSCULOSKELETAL: No swelling or redness of joints, no limitation of range of motion, no weakness or numbness. PAINS IN KNEES/LEGS. NEURO/PSYCH: No weakness, no tremor, no seizures,  no ataxia. No changes in sleep habits, no changes in thought content. CONFUSION AT TIMES, MEMORY LOSS. DEPRESSION/ANXIETY. All other systems negative. Labs: 10/19/20 TSH <0.010, T3 102.70, Free T4 1.61  9/21/20 HH 10.2/33.9, GFR 44, Bun/Creat 31/1.4, A1c 6.4, LFT nl, TSH <0.010, , Tri 163, HDL 44, LDL 65  3/17/20 A1c 6.5, GFR 58, Bun/Creat 24/1.1, lytes nml  12/23/19 Mammo neg     ASSESSMENT:  Elderly female with has CKD (chronic kidney disease) stage 3, GFR 30-59 ml/min; Essential hypertension; DM (diabetes mellitus) type II controlled with renal manifestation ([facility] [address] Insomnia, unspecified; Generalized osteoarthrosis, involving multiple sites; Obstructive chronic bronchitis without exacerbation ([facility] [address] Personal history of fall; Vascular dementia, uncomplicated ([facility] [address] Other constipation; Difficulty walking; Agitation; Anxiety; and Obesity (BMI 30.0-34.9) on their problem list.     Higinio Ballesteros was seen today for hypertension and joint pain. Diagnoses and all orders for this visit:    Vascular dementia, uncomplicated ([facility] [address]    Essential hypertension    Generalized osteoarthrosis, involving multiple sites    Difficulty walking       PLAN:  Continue PT to improve gait, balance training and strengthening. Discussed diet and exercise. Encouraged to keep up with fluid intake. Follow up with Dr. Tavares Prieto (ortho) on regular basis. Check A1c and BMP every 3 months, next in January. Continue other meds as per Rx List.  Recheck 1 month. 40 minutes spent on visit, 25 minutes involved education/counseling regarding cardiac, DJD, T2DM and HTN disease processes, treatment options, meds and coordination of care with [facility] and pharmacy. Current Outpatient Medications   Medication Sig Dispense Refill    diclofenac sodium (VOLTAREN) 1 % GEL APPLY TOPICALLY TO UPPER JOINT (KNEES) THREE(3) TIMES DAILY **RE-ORDER ACCORDINGLY** 100 g 11    hydrALAZINE (APRESOLINE) 100 MG tablet TAKE 1 TABLET BY MOUTH THREE TIMES DAILY FOR HTN.  90 tablet 11    fluticasone (FLONASE) 50 MCG/ACT nasal spray USE 1 SPRAY IN EACH NOSTRIL TWICE DAILY FOR SEASONAL ALLERGIES *SHAKE GENTLY* 16 g 11  polyethylene glycol (GLYCOLAX) powder Take 17 g by mouth daily as needed (constipation) Dissolve in 8 ounces of water       No current facility-administered medications for this visit. Return in about 1 month (around 1/14/2021). An electronic signature was used to authenticate this note.     --Jasvir Zamarripa MD on 12/14/2020 at 11:24 AM

## 2020-12-14 ENCOUNTER — OFFICE VISIT (OUTPATIENT)
Dept: PRIMARY CARE CLINIC | Age: 75
End: 2020-12-14
Payer: COMMERCIAL

## 2020-12-14 VITALS
RESPIRATION RATE: 20 BRPM | OXYGEN SATURATION: 97 % | BODY MASS INDEX: 33.01 KG/M2 | HEART RATE: 69 BPM | DIASTOLIC BLOOD PRESSURE: 73 MMHG | SYSTOLIC BLOOD PRESSURE: 136 MMHG | WEIGHT: 205.4 LBS | TEMPERATURE: 97.9 F | HEIGHT: 66 IN

## 2020-12-14 PROCEDURE — G8484 FLU IMMUNIZE NO ADMIN: HCPCS | Performed by: FAMILY MEDICINE

## 2020-12-14 PROCEDURE — 1090F PRES/ABSN URINE INCON ASSESS: CPT | Performed by: FAMILY MEDICINE

## 2020-12-14 PROCEDURE — 1123F ACP DISCUSS/DSCN MKR DOCD: CPT | Performed by: FAMILY MEDICINE

## 2020-12-14 PROCEDURE — G8417 CALC BMI ABV UP PARAM F/U: HCPCS | Performed by: FAMILY MEDICINE

## 2020-12-19 LAB
SARS-COV-2: NOT DETECTED
SOURCE: NORMAL

## 2020-12-22 RX ORDER — ZINC SULFATE 50(220)MG
CAPSULE ORAL
Qty: 1 CAPSULE | Refills: 10 | Status: SHIPPED | OUTPATIENT
Start: 2020-12-22

## 2020-12-22 RX ORDER — IVERMECTIN 3 MG/1
TABLET ORAL
Qty: 1 TABLET | Refills: 10 | Status: SHIPPED
Start: 2020-12-22 | End: 2021-01-09 | Stop reason: SDUPTHER

## 2020-12-22 RX ORDER — ASCORBIC ACID 500 MG
TABLET ORAL
Qty: 1 TABLET | Refills: 10 | Status: SHIPPED | OUTPATIENT
Start: 2020-12-22

## 2020-12-25 LAB
SARS-COV-2: NOT DETECTED
SOURCE: NORMAL

## 2020-12-31 LAB
SARS-COV-2: NOT DETECTED
SOURCE: NORMAL

## 2021-01-06 LAB
AMORPHOUS: ABNORMAL
BACTERIA: ABNORMAL /HPF
BILIRUBIN URINE: NEGATIVE
BLOOD, URINE: NEGATIVE
CLARITY: CLEAR
COLOR: YELLOW
GLUCOSE URINE: NEGATIVE MG/DL
KETONES, URINE: NEGATIVE MG/DL
LEUKOCYTE ESTERASE, URINE: ABNORMAL
NITRITE, URINE: NEGATIVE
PH UA: 5 (ref 5–9)
PROTEIN UA: ABNORMAL MG/DL
RBC UA: ABNORMAL /HPF (ref 0–2)
SARS-COV-2: DETECTED
SOURCE: ABNORMAL
SPECIFIC GRAVITY UA: 1.02 (ref 1–1.03)
UROBILINOGEN, URINE: 0.2 E.U./DL
WBC UA: ABNORMAL /HPF (ref 0–5)

## 2021-01-08 ENCOUNTER — OFFICE VISIT (OUTPATIENT)
Dept: PRIMARY CARE CLINIC | Age: 76
End: 2021-01-08
Payer: COMMERCIAL

## 2021-01-08 VITALS
TEMPERATURE: 96.9 F | HEART RATE: 79 BPM | DIASTOLIC BLOOD PRESSURE: 70 MMHG | HEIGHT: 66 IN | OXYGEN SATURATION: 94 % | BODY MASS INDEX: 33.15 KG/M2 | SYSTOLIC BLOOD PRESSURE: 140 MMHG

## 2021-01-08 DIAGNOSIS — R26.2 DIFFICULTY WALKING: ICD-10-CM

## 2021-01-08 DIAGNOSIS — E11.22 CONTROLLED TYPE 2 DIABETES MELLITUS WITH STAGE 3 CHRONIC KIDNEY DISEASE, WITHOUT LONG-TERM CURRENT USE OF INSULIN (HCC): ICD-10-CM

## 2021-01-08 DIAGNOSIS — I10 ESSENTIAL HYPERTENSION: Chronic | ICD-10-CM

## 2021-01-08 DIAGNOSIS — U07.1 COVID-19: Primary | ICD-10-CM

## 2021-01-08 DIAGNOSIS — M15.9 GENERALIZED OSTEOARTHROSIS, INVOLVING MULTIPLE SITES: ICD-10-CM

## 2021-01-08 DIAGNOSIS — F01.50 VASCULAR DEMENTIA, UNCOMPLICATED (HCC): ICD-10-CM

## 2021-01-08 DIAGNOSIS — N18.30 CONTROLLED TYPE 2 DIABETES MELLITUS WITH STAGE 3 CHRONIC KIDNEY DISEASE, WITHOUT LONG-TERM CURRENT USE OF INSULIN (HCC): ICD-10-CM

## 2021-01-08 LAB — URINE CULTURE, ROUTINE: NORMAL

## 2021-01-08 PROCEDURE — 1090F PRES/ABSN URINE INCON ASSESS: CPT | Performed by: PHYSICIAN ASSISTANT

## 2021-01-08 PROCEDURE — 1123F ACP DISCUSS/DSCN MKR DOCD: CPT | Performed by: PHYSICIAN ASSISTANT

## 2021-01-08 PROCEDURE — G8417 CALC BMI ABV UP PARAM F/U: HCPCS | Performed by: PHYSICIAN ASSISTANT

## 2021-01-08 PROCEDURE — G8484 FLU IMMUNIZE NO ADMIN: HCPCS | Performed by: PHYSICIAN ASSISTANT

## 2021-01-08 NOTE — PROGRESS NOTES
1/8/2021     Home visit medically necessary in lieu of an office visit due to: [facility] resident, dementia, uses walker, difficult to get out. HPI:  Had been testing negative for COVID until 1/5/21. Was moved to COVID unit at [facility]. Denies SOB or fever/chills but has a mild cough at times. Had been already placed on Ivermectin COVID therapy. Nursing reports several episodes of nausea and vomiting this am along with 1 case of diarrhea. This past month there has been urinary symptoms but both cx were negative. PT over for now. She still has chronic arthritic knee pain and takes Tylenol and has Voltaren ointment. She has had no swelling of LEs. Appetite has been generally good. Health shake is prn. She is moving bowels almost every day. She has been in good spirits most the time and she is sleeping well. REVIEW OF SYSTEMS:    GENERAL: Appetite good. Recent weight gain, generally healthy, no change in strength or exercise tolerance. CARDIOVASCULAR: No edema, no chest pains, no murmurs, no palpitations, no syncope, no orthopnea. RESPIRATORY: No shortness of breath, no pain with breathing, no wheezing. OCCAS COUGH TO CLEAR THROAT. GASTROINTESTINAL: No change in appetite, no dysphagia, no heartburn, no constipation or diarrhea, no bowel habit changes, no emesis, no melena, no hemorrhoids. GENITOURINARY: No incontinence or retention, no urinary urgency, no nocturia, no frequent UTIs, no dysuria, no change in nature of urine. MUSCULOSKELETAL: No swelling or redness of joints, no limitation of range of motion, no weakness or numbness. PAINS IN KNEES/LEGS. NEURO/PSYCH: No weakness, no tremor, no seizures,  no ataxia. No changes in sleep habits, no changes in thought content. CONFUSION AT TIMES, MEMORY LOSS. DEPRESSION/ANXIETY. All other systems negative. PAST MEDICAL HISTORY:  (Major events, hospitalizations, surgeries):  Appendectomy. Known allergies: NKA. Ongoing medical problems: T2DM, HTN, COPD, OA, Depressive disorder, anxiety, dementia. Family medical history: Non-contributory. Preventative: Flu vac - 10/2020. Pneumovax - 2019. Last eye exam - 2012. Last dental exam - 2009. Social history: . No children. Denominational. 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 Completed 8th grade. DNR-CC. PHYSICAL EXAM:      Vitals:    01/08/21 1126   BP: (!) 140/70   Pulse: 79   Temp: 96.9 °F (36.1 °C)   TempSrc: Temporal   SpO2: 94%   Weight: Comment: not performed due to COVID quarantine   Height: 5' 6\" (1.676 m)        GEN:  elderly  WDWN female patient laying in bed in NAD. HEAD:  atraumatic, normocephalic. EYES:  EOMI, PERRL, no cataracts, conjunctivae appear normal.  ENT:   Good hearing, EACs without wax, TM's normal, nasal septum midline, no significant congestion, oral cavity without lesions, edentulous no dentures. ABD:  Well healed scar. Non tender to palp, no palp masses or HSM, normal BS. LUNGS: Clear to auscultation. No wheezes, no rhonchi or rales. HEART:  RRR, no murmurs, or gallops  BACK:  no scoliosis or kyphosis, non-tender to palp. EXTREMITIES: No pedal edema, no ulcerations, varicosities or erythema. No gross deformities. MUSCULOSKELETAL: Decreased ROM of knee joints due to pain and stiffness. Otherwise, joints are non-tender with full ROM. SKIN:  No ulcerations or breakdown, rash, ecchymosis, or other lesions. NEURO:   No tremor, motor UEs 5/5 LEs  5/5, sensory normal, able to stand with extra effort, walks very slowly with mild-mod ataxia. PSYCH:  Pleasant and cooperative. Fluid  slow speech, oriented to person only. No delusional statements. Remembered 2/3 items. Medications reviewed. Labs: 1/6/21 UA CX neg  1/5/21 COVID+  12/4/20 UA CX neg  10/19/20 TSH <0.010, T3 102.70, Free T4 1.61  9/21/20 HH 10.2/33.9, GFR 44, Bun/Creat 31/1.4, A1c 6.4, LFT nl, TSH <0.010, , Tri 163, HDL 44, LDL 65  3/17/20 A1c 6.5, GFR 58, Bun/Creat 24/1.1, lytes nml  12/23/19 Mammo neg     ASSESSMENT:  Elderly female with has CKD (chronic kidney disease) stage 3, GFR 30-59 ml/min; Essential hypertension; DM (diabetes mellitus) type II controlled with renal manifestation ([address] Insomnia, unspecified; Generalized osteoarthrosis, involving multiple sites; Obstructive chronic bronchitis without exacerbation ([address] Personal history of fall; Vascular dementia, uncomplicated ([address] Other constipation; Difficulty walking; Agitation; Anxiety; Obesity (BMI 30.0-34.9); and COVID-19 on their problem list.     Maria C Mejia was seen today for positive for covid-19 and hypertension. Diagnoses and all orders for this visit:    COVID-19    Vascular dementia, uncomplicated ([address]    Essential hypertension  -     BASIC METABOLIC PANEL; Future    Controlled type 2 diabetes mellitus with stage 3 chronic kidney disease, without long-term current use of insulin ([address]  -     BASIC METABOLIC PANEL; Future  -     HEMOGLOBIN A1C; Future    Generalized osteoarthrosis, involving multiple sites    Difficulty walking       PLAN:  Continue COVID isolation protocols. Has prn orders for Zofran, Imodium and Robitussin DM. Continue Ivermectin COVID treament. Discussed diet and exercise. Encouraged to keep up with fluid intake. Follow up with Dr. Alda Prieto (ortho) on regular basis. Check A1c and BMP - then every 3 months, next in April. Continue other meds as per Rx List.  Recheck 1 month. 40 minutes spent on visit, 25 minutes involved education/counseling regarding cardiac, DJD, T2DM and HTN disease processes, treatment options, meds and coordination of care with [facility] and pharmacy.      Current Outpatient Medications Medication Sig Dispense Refill    ascorbic acid (VITAMIN C) 500 MG tablet TAKE 2 TABLETS (1000MG) BY MOUTH ONCE DAILY. 1 tablet 10    zinc sulfate (ZINCATE) 220 (50 Zn) MG capsule TAKE 1 CAPSULE BY MOUTH DAILY 1 capsule 10    ivermectin 3 MG tablet TAKE 6 TABS (18MG) BY MOUTH ON DAY 1 AND DAY 3 THEN ONCE A MONTH 1 tablet 10    diclofenac sodium (VOLTAREN) 1 % GEL APPLY TOPICALLY TO UPPER JOINT (KNEES) THREE(3) TIMES DAILY **RE-ORDER ACCORDINGLY** 100 g 11    hydrALAZINE (APRESOLINE) 100 MG tablet TAKE 1 TABLET BY MOUTH THREE TIMES DAILY FOR HTN. 90 tablet 11    fluticasone (FLONASE) 50 MCG/ACT nasal spray USE 1 SPRAY IN EACH NOSTRIL TWICE DAILY FOR SEASONAL ALLERGIES *SHAKE GENTLY* 16 g 11    Multiple Vitamin (DAILY-MARYLIN) TABS TAKE 1 TABLET BY MOUTH DAILY 30 tablet 10    acetaminophen (NON-ASPIRIN PAIN RELIEF) 325 MG tablet Take 2 tablets by mouth daily 60 tablet 11    loratadine (CLARITIN) 10 MG tablet TAKE ONE(1) TABLET BY MOUTH ONCE DAILY 31 tablet 10    SYSTANE 0.4-0.3 % ophthalmic solution INSTILL 1 DROP INTO AFFECTED EYE(S) TWICE DAILY 15 mL 11    metFORMIN (GLUCOPHAGE) 500 MG tablet TAKE 1 TABLET BY MOUTH TWICE DAILY 62 tablet 11     MG capsule TAKE 1 CAPSULE BY MOUTH TWICE DAILY 62 capsule 11    vitamin D (ERGOCALCIFEROL) 1.25 MG (08654 UT) CAPS capsule TAKE 1 CAPSULE BY MOUTH ONCE EVERY OTHER WEEK ON SATURDAY **RE-ORDER ACCORDINGLY** 2 capsule 10    lisinopril-hydroCHLOROthiazide (PRINZIDE;ZESTORETIC) 20-12.5 MG per tablet TAKE 1 TABLET BY MOUTH TWICE DAILY 60 tablet 10    PARoxetine (PAXIL) 40 MG tablet Take 1 tablet by mouth daily 30 tablet 11    SPIRIVA HANDIHALER 18 MCG inhalation capsule INHALE CONTENTS OF 1 CAP VIA HANDIHALER ONCE DAILY. *CAPS SHOULD BE STORED IN BLISTER & ONLY REMOVED BEFORE USE* RINSE MOUTH WITH WATER AFTER 30 capsule 11    ondansetron (ZOFRAN-ODT) 8 MG TBDP disintegrating tablet TAKE 1 TABLET SL EVERY 6 HOURS AS NEEDED 1 tablet 0

## 2021-01-09 ENCOUNTER — TELEPHONE (OUTPATIENT)
Dept: PRIMARY CARE CLINIC | Age: 76
End: 2021-01-09

## 2021-01-09 ENCOUNTER — HOSPITAL ENCOUNTER (EMERGENCY)
Age: 76
Discharge: OTHER INSTITUTION | End: 2021-01-10
Attending: EMERGENCY MEDICINE
Payer: COMMERCIAL

## 2021-01-09 ENCOUNTER — APPOINTMENT (OUTPATIENT)
Dept: GENERAL RADIOLOGY | Age: 76
End: 2021-01-09
Payer: COMMERCIAL

## 2021-01-09 DIAGNOSIS — U07.1 COVID-19 VIRUS INFECTION: Primary | ICD-10-CM

## 2021-01-09 LAB
INR BLD: 1
PROTHROMBIN TIME: 11.3 SEC (ref 9.3–12.4)

## 2021-01-09 PROCEDURE — 83880 ASSAY OF NATRIURETIC PEPTIDE: CPT

## 2021-01-09 PROCEDURE — 85610 PROTHROMBIN TIME: CPT

## 2021-01-09 PROCEDURE — 84484 ASSAY OF TROPONIN QUANT: CPT

## 2021-01-09 PROCEDURE — 85025 COMPLETE CBC W/AUTO DIFF WBC: CPT

## 2021-01-09 PROCEDURE — 99285 EMERGENCY DEPT VISIT HI MDM: CPT

## 2021-01-09 PROCEDURE — 93005 ELECTROCARDIOGRAM TRACING: CPT | Performed by: EMERGENCY MEDICINE

## 2021-01-09 PROCEDURE — 71045 X-RAY EXAM CHEST 1 VIEW: CPT

## 2021-01-09 PROCEDURE — 80053 COMPREHEN METABOLIC PANEL: CPT

## 2021-01-09 RX ORDER — DEXAMETHASONE 6 MG/1
6 TABLET ORAL
Qty: 10 TABLET | Refills: 0 | Status: SHIPPED | OUTPATIENT
Start: 2021-01-09 | End: 2021-01-19

## 2021-01-09 RX ORDER — DEXAMETHASONE 6 MG/1
6 TABLET ORAL 2 TIMES DAILY WITH MEALS
Qty: 20 TABLET | Refills: 0 | Status: SHIPPED
Start: 2021-01-09 | End: 2021-01-09 | Stop reason: SDUPTHER

## 2021-01-09 RX ORDER — IVERMECTIN 3 MG/1
TABLET ORAL
Qty: 12 TABLET | Refills: 0 | Status: SHIPPED
Start: 2021-01-09 | End: 2021-01-13

## 2021-01-09 NOTE — TELEPHONE ENCOUNTER
Nurse Roberto Carlos Marcelino called to report patient's Sp02 has been trending lower, now in the high 80's range. Order given to increase O2 by NC to 2-5 LPM to maintain SpO2>90%. Also eRx sent for dexamethasone 6mg daily and ivermectin 18mg now and repeat in 2 days.

## 2021-01-10 ENCOUNTER — APPOINTMENT (OUTPATIENT)
Dept: CT IMAGING | Age: 76
End: 2021-01-10
Payer: COMMERCIAL

## 2021-01-10 VITALS
TEMPERATURE: 97.7 F | SYSTOLIC BLOOD PRESSURE: 169 MMHG | HEIGHT: 66 IN | DIASTOLIC BLOOD PRESSURE: 70 MMHG | HEART RATE: 76 BPM | RESPIRATION RATE: 18 BRPM | BODY MASS INDEX: 32.95 KG/M2 | WEIGHT: 205 LBS | OXYGEN SATURATION: 100 %

## 2021-01-10 LAB
ALBUMIN SERPL-MCNC: 3.5 G/DL (ref 3.5–5.2)
ALP BLD-CCNC: 83 U/L (ref 35–104)
ALT SERPL-CCNC: 21 U/L (ref 0–32)
ANION GAP SERPL CALCULATED.3IONS-SCNC: 11 MMOL/L (ref 7–16)
AST SERPL-CCNC: 36 U/L (ref 0–31)
BASOPHILS ABSOLUTE: 0 E9/L (ref 0–0.2)
BASOPHILS RELATIVE PERCENT: 0 % (ref 0–2)
BILIRUB SERPL-MCNC: 0.2 MG/DL (ref 0–1.2)
BUN BLDV-MCNC: 38 MG/DL (ref 8–23)
CALCIUM SERPL-MCNC: 8.6 MG/DL (ref 8.6–10.2)
CHLORIDE BLD-SCNC: 101 MMOL/L (ref 98–107)
CO2: 24 MMOL/L (ref 22–29)
CREAT SERPL-MCNC: 1.5 MG/DL (ref 0.5–1)
EKG ATRIAL RATE: 69 BPM
EKG P AXIS: 51 DEGREES
EKG P-R INTERVAL: 184 MS
EKG Q-T INTERVAL: 434 MS
EKG QRS DURATION: 78 MS
EKG QTC CALCULATION (BAZETT): 465 MS
EKG R AXIS: 0 DEGREES
EKG T AXIS: 77 DEGREES
EKG VENTRICULAR RATE: 69 BPM
EOSINOPHILS ABSOLUTE: 0 E9/L (ref 0.05–0.5)
EOSINOPHILS RELATIVE PERCENT: 0.2 % (ref 0–6)
GFR AFRICAN AMERICAN: 41
GFR NON-AFRICAN AMERICAN: 41 ML/MIN/1.73
GLUCOSE BLD-MCNC: 113 MG/DL (ref 74–99)
HCT VFR BLD CALC: 35.9 % (ref 34–48)
HEMOGLOBIN: 10.5 G/DL (ref 11.5–15.5)
HYPOCHROMIA: ABNORMAL
LYMPHOCYTES ABSOLUTE: 1.06 E9/L (ref 1.5–4)
LYMPHOCYTES RELATIVE PERCENT: 23.5 % (ref 20–42)
MCH RBC QN AUTO: 24.1 PG (ref 26–35)
MCHC RBC AUTO-ENTMCNC: 29.2 % (ref 32–34.5)
MCV RBC AUTO: 82.5 FL (ref 80–99.9)
MONOCYTES ABSOLUTE: 0.35 E9/L (ref 0.1–0.95)
MONOCYTES RELATIVE PERCENT: 7.8 % (ref 2–12)
NEUTROPHILS ABSOLUTE: 3.04 E9/L (ref 1.8–7.3)
NEUTROPHILS RELATIVE PERCENT: 68.7 % (ref 43–80)
OVALOCYTES: ABNORMAL
PDW BLD-RTO: 13.9 FL (ref 11.5–15)
PLATELET # BLD: 215 E9/L (ref 130–450)
PMV BLD AUTO: 11.8 FL (ref 7–12)
POIKILOCYTES: ABNORMAL
POTASSIUM SERPL-SCNC: 4.8 MMOL/L (ref 3.5–5)
PRO-BNP: 1002 PG/ML (ref 0–450)
RBC # BLD: 4.35 E12/L (ref 3.5–5.5)
SODIUM BLD-SCNC: 136 MMOL/L (ref 132–146)
TOTAL PROTEIN: 6.9 G/DL (ref 6.4–8.3)
TROPONIN: <0.01 NG/ML (ref 0–0.03)
WBC # BLD: 4.4 E9/L (ref 4.5–11.5)

## 2021-01-10 PROCEDURE — 71275 CT ANGIOGRAPHY CHEST: CPT

## 2021-01-10 PROCEDURE — 6360000004 HC RX CONTRAST MEDICATION: Performed by: RADIOLOGY

## 2021-01-10 PROCEDURE — 93010 ELECTROCARDIOGRAM REPORT: CPT | Performed by: INTERNAL MEDICINE

## 2021-01-10 RX ADMIN — IOPAMIDOL 75 ML: 755 INJECTION, SOLUTION INTRAVENOUS at 04:54

## 2021-01-10 NOTE — ED NOTES
Pt alert to self and place skin warm dry resp easy  Lungs diminished denies sob or cp atthis time     Bela Sheffield RN  01/10/21 9109

## 2021-01-10 NOTE — ED NOTES
Pt alert oriented to self and place skin warm dry resp easy lungs cta denies pain o2 remains on ptat this time pulse ox 100     Abraham Celaya RN  01/10/21 5841

## 2021-01-10 NOTE — ED PROVIDER NOTES
HPI:  1/9/21, Time: 10:20 PM NIDA Davis is a 66 y.o. female presenting to the ED for hx of being Covid positive and reportedly not able to keep on oxygen, beginning 1 day ago. The complaint has been persistent, mild in severity, and worsened by nothing. Patient was sent here from outlying facility. Patient reportedly has a Covid and unable to keep oxygen on. Patient does have history of dementia. Patient reporting no chest pain no abdominal pain or vomiting. Patient reporting no cough. Patient history is very limited due to her dementia. She is only oriented to person. ROS:   Pertinent positives and negatives are stated within HPI, all other systems reviewed and are negative.  --------------------------------------------- PAST HISTORY ---------------------------------------------  Past Medical History:  has a past medical history of Hypertension and Type II or unspecified type diabetes mellitus without mention of complication, not stated as uncontrolled. Past Surgical History:  has a past surgical history that includes Ankle surgery (1980's); Appendectomy; and ECHO Compl W Dop Color Flow (11/18/2011). Social History:  reports that she quit smoking about 3 years ago. Her smoking use included cigarettes. She has a 13.50 pack-year smoking history. She has never used smokeless tobacco. She reports that she does not drink alcohol or use drugs. Family History: family history includes Cancer in her sister. The patients home medications have been reviewed.     Allergies: Nicotine polacrilex    ---------------------------------------------------PHYSICAL EXAM--------------------------------------    Constitutional/General: Alert and oriented to person, well appearing, non toxic in NAD  Head: Normocephalic and atraumatic  Eyes: PERRL, EOMI  Mouth: Oropharynx clear, handling secretions, no trismus Comprehensive Metabolic Panel   Result Value Ref Range    Sodium 136 132 - 146 mmol/L    Potassium 4.8 3.5 - 5.0 mmol/L    Chloride 101 98 - 107 mmol/L    CO2 24 22 - 29 mmol/L    Anion Gap 11 7 - 16 mmol/L    Glucose 113 (H) 74 - 99 mg/dL    BUN 38 (H) 8 - 23 mg/dL    CREATININE 1.5 (H) 0.5 - 1.0 mg/dL    GFR Non-African American 41 >=60 mL/min/1.73    GFR African American 41     Calcium 8.6 8.6 - 10.2 mg/dL    Total Protein 6.9 6.4 - 8.3 g/dL    Alb 3.5 3.5 - 5.2 g/dL    Total Bilirubin 0.2 0.0 - 1.2 mg/dL    Alkaline Phosphatase 83 35 - 104 U/L    ALT 21 0 - 32 U/L    AST 36 (H) 0 - 31 U/L   Troponin   Result Value Ref Range    Troponin <0.01 0.00 - 0.03 ng/mL   Protime-INR   Result Value Ref Range    Protime 11.3 9.3 - 12.4 sec    INR 1.0    Brain Natriuretic Peptide   Result Value Ref Range    Pro-BNP 1,002 (H) 0 - 450 pg/mL   EKG 12 Lead   Result Value Ref Range    Ventricular Rate 69 BPM    Atrial Rate 69 BPM    P-R Interval 184 ms    QRS Duration 78 ms    Q-T Interval 434 ms    QTc Calculation (Bazett) 465 ms    P Axis 51 degrees    R Axis 0 degrees    T Axis 77 degrees       RADIOLOGY:  Interpreted by Radiologist.  CTA CHEST W CONTRAST   Final Result   No evidence of acute pulmonary embolic disease. Bilateral pneumonia   suspicious for COVID-19. Nonspecific mediastinal adenopathy. Additional observations as outlined above. XR CHEST PORTABLE   Final Result   Multifocal airspace disease most prominently in the right lung and most   consistent with multifocal pneumonia. Decreased inspiration and elevation of the left hemidiaphragm. Left basilar   atelectasis. Apparently calcified mass in the left upper mediastinum which may represent   adenopathy. Further evaluation and characterization with CT recommended   given the interval change since the prior examination. The          EKG: This EKG is signed and interpreted by me.     Rate: 69  Rhythm: Sinus  Interpretation: non-specific EKG Comparison: unAble to access EKG from 2015        ------------------------- NURSING NOTES AND VITALS REVIEWED ---------------------------   The nursing notes within the ED encounter and vital signs as below have been reviewed by myself. BP (!) 166/81   Pulse 67   Temp 97.7 °F (36.5 °C)   Resp 18   Ht 5' 6\" (1.676 m)   Wt 205 lb (93 kg)   SpO2 96%   BMI 33.09 kg/m²   Oxygen Saturation Interpretation: Normal    The patients available past medical records and past encounters were reviewed. ------------------------------ ED COURSE/MEDICAL DECISION MAKING----------------------  Medications   iopamidol (ISOVUE-370) 76 % injection 75 mL (75 mLs Intravenous Given 1/10/21 5816)             Medical Decision Making:   I did speak to her guardian. And guardian believes that patient is a DNR CC we did contact nursing home and per report patient is taking off her supplemental oxygen. And we did speak to them at the nursing home. Per report patient is a DNR CC they did fax information to us. CT shows no PE. Patient is in no respiratory distress patient will be discharged back to facility. Re-Evaluations:             Re-evaluation. Patients symptoms show no change  Reevaluated several times in the emergency department. Patient having no complaints. Patient only oriented to person only. Patient does have dementia. Patient labs and CT noted and reviewed. Pulse ox on room air here in the emergency department was 92 to 93%. We did place patient back on oxygen her pulse ox did then dropped down to 89%. Patient is in no respiratory distress though. Patient did remove her oxygen while here in the emergency department. Consultations:             I did speak to [facility] who is on-call for patient's guardian. Critical Care: This patient's ED course included: a personal history and physicial eaxmination    This patient has been closely monitored during their ED course.     Counseling: The emergency provider has spoken with the patient and discussed todays results, in addition to providing specific details for the plan of care and counseling regarding the diagnosis and prognosis. Questions are answered at this time and they are agreeable with the plan.       --------------------------------- IMPRESSION AND DISPOSITION ---------------------------------    IMPRESSION  1. COVID-19 virus infection        DISPOSITION  Disposition: Discharged back to nursing home  Patient condition is stable        NOTE: This report was transcribed using voice recognition software.  Every effort was made to ensure accuracy; however, inadvertent computerized transcription errors may be present          Dameon Randall MD  01/10/21 Shameka Palafox MD  01/10/21 3510

## 2021-01-10 NOTE — ED NOTES
Pt alert oriented to self and place resp easy pulse ox 92 on room air pt does keep removing o2     Abraham Celaya RN  01/10/21 1341

## 2021-01-11 ENCOUNTER — TELEPHONE (OUTPATIENT)
Dept: PRIMARY CARE CLINIC | Age: 76
End: 2021-01-11

## 2021-01-11 NOTE — TELEPHONE ENCOUNTER
Fax from Claire Olivo 1949. Billy Maria M was started on Ivermectin. Do you want her to have that on day 1 & 3 only, or monthly after that?

## 2021-01-11 NOTE — TELEPHONE ENCOUNTER
Fax from Claire Olivo 1949. During COVID assessment SpO2 87%. Oxygen applied via NC @ 2L, increased to 93%. Would you like to start Dexamethasone or Prednisone?

## 2021-01-13 RX ORDER — IVERMECTIN 3 MG/1
TABLET ORAL
Qty: 12 TABLET | Refills: 0 | Status: SHIPPED | OUTPATIENT
Start: 2021-01-13

## 2021-01-14 ENCOUNTER — TELEPHONE (OUTPATIENT)
Dept: PRIMARY CARE CLINIC | Age: 76
End: 2021-01-14

## 2021-01-14 LAB
ANION GAP SERPL CALCULATED.3IONS-SCNC: 12 MMOL/L (ref 7–16)
BUN BLDV-MCNC: 52 MG/DL (ref 8–23)
CALCIUM SERPL-MCNC: 9.2 MG/DL (ref 8.6–10.2)
CHLORIDE BLD-SCNC: 103 MMOL/L (ref 98–107)
CO2: 22 MMOL/L (ref 22–29)
CREAT SERPL-MCNC: 1.5 MG/DL (ref 0.5–1)
GFR AFRICAN AMERICAN: 41
GFR NON-AFRICAN AMERICAN: 34 ML/MIN/1.73
GLUCOSE BLD-MCNC: 344 MG/DL (ref 74–99)
HBA1C MFR BLD: 6.6 % (ref 4–5.6)
POTASSIUM SERPL-SCNC: 4.9 MMOL/L (ref 3.5–5)
SODIUM BLD-SCNC: 137 MMOL/L (ref 132–146)

## 2021-01-14 NOTE — TELEPHONE ENCOUNTER
Fax from Claire Alberto7. To clarify, since Carolina Chacon is COVID 19+, would you like Ivermectin given 1 month as previously ordered?  Please advise

## 2021-01-15 ENCOUNTER — TELEPHONE (OUTPATIENT)
Dept: PRIMARY CARE CLINIC | Age: 76
End: 2021-01-15

## 2021-01-15 DIAGNOSIS — E11.22 CONTROLLED TYPE 2 DIABETES MELLITUS WITH STAGE 3 CHRONIC KIDNEY DISEASE, WITHOUT LONG-TERM CURRENT USE OF INSULIN (HCC): ICD-10-CM

## 2021-01-15 DIAGNOSIS — N18.30 CONTROLLED TYPE 2 DIABETES MELLITUS WITH STAGE 3 CHRONIC KIDNEY DISEASE, WITHOUT LONG-TERM CURRENT USE OF INSULIN (HCC): ICD-10-CM

## 2021-01-15 NOTE — TELEPHONE ENCOUNTER
Fax from Claire Olivo 1949. Janett Parikh has decreased memory and cognition. She could benefit from more 1:1. Ok to transfer t memory care to be permanent? Guardian is ok with transfer.

## 2021-01-18 ENCOUNTER — TELEPHONE (OUTPATIENT)
Dept: PRIMARY CARE CLINIC | Age: 76
End: 2021-01-18

## 2021-01-18 NOTE — TELEPHONE ENCOUNTER
Fax from Claire Olivo 1949. Gini Tolentino started on 2-3L via n/c prn for decreased SpO2. She often removes N/c and SpO2 varies 83-95% (95 when O2 on).  Would you like O2 continued or a respiratory therapist to eval?

## 2021-03-09 LAB — GLUCOSE FASTING: 90 MG/DL (ref 74–99)

## 2021-03-16 LAB — TSH SERPL DL<=0.05 MIU/L-ACNC: <0.01 UIU/ML (ref 0.27–4.2)

## 2021-04-13 LAB
ALBUMIN SERPL-MCNC: 2.9 G/DL (ref 3.5–5.2)
ALP BLD-CCNC: 81 U/L (ref 35–104)
ALT SERPL-CCNC: 16 U/L (ref 0–32)
ANION GAP SERPL CALCULATED.3IONS-SCNC: 12 MMOL/L (ref 7–16)
AST SERPL-CCNC: 14 U/L (ref 0–31)
BASOPHILS ABSOLUTE: 0 E9/L (ref 0–0.2)
BASOPHILS RELATIVE PERCENT: 0 % (ref 0–2)
BILIRUB SERPL-MCNC: 0.2 MG/DL (ref 0–1.2)
BUN BLDV-MCNC: 41 MG/DL (ref 8–23)
CALCIUM SERPL-MCNC: 9.3 MG/DL (ref 8.6–10.2)
CHLORIDE BLD-SCNC: 104 MMOL/L (ref 98–107)
CO2: 20 MMOL/L (ref 22–29)
CREAT SERPL-MCNC: 1.6 MG/DL (ref 0.5–1)
EOSINOPHILS ABSOLUTE: 0 E9/L (ref 0.05–0.5)
EOSINOPHILS RELATIVE PERCENT: 0 % (ref 0–6)
GFR AFRICAN AMERICAN: 38
GFR NON-AFRICAN AMERICAN: 31 ML/MIN/1.73
GLUCOSE FASTING: 97 MG/DL (ref 74–99)
HBA1C MFR BLD: 5.8 % (ref 4–5.6)
HCT VFR BLD CALC: 32.9 % (ref 34–48)
HEMOGLOBIN: 9.9 G/DL (ref 11.5–15.5)
IMMATURE GRANULOCYTES #: 0.02 E9/L
IMMATURE GRANULOCYTES %: 0.4 % (ref 0–5)
LYMPHOCYTES ABSOLUTE: 1.41 E9/L (ref 1.5–4)
LYMPHOCYTES RELATIVE PERCENT: 27 % (ref 20–42)
MCH RBC QN AUTO: 24.6 PG (ref 26–35)
MCHC RBC AUTO-ENTMCNC: 30.1 % (ref 32–34.5)
MCV RBC AUTO: 81.6 FL (ref 80–99.9)
MONOCYTES ABSOLUTE: 0.73 E9/L (ref 0.1–0.95)
MONOCYTES RELATIVE PERCENT: 14 % (ref 2–12)
NEUTROPHILS ABSOLUTE: 3.06 E9/L (ref 1.8–7.3)
NEUTROPHILS RELATIVE PERCENT: 58.6 % (ref 43–80)
PDW BLD-RTO: 14.2 FL (ref 11.5–15)
PLATELET # BLD: 326 E9/L (ref 130–450)
PMV BLD AUTO: 11.2 FL (ref 7–12)
POTASSIUM SERPL-SCNC: 4.8 MMOL/L (ref 3.5–5)
RBC # BLD: 4.03 E12/L (ref 3.5–5.5)
SODIUM BLD-SCNC: 136 MMOL/L (ref 132–146)
TOTAL PROTEIN: 6.6 G/DL (ref 6.4–8.3)
WBC # BLD: 5.2 E9/L (ref 4.5–11.5)

## 2021-04-18 LAB — SARS-COV-2, PCR: NOT DETECTED

## 2021-04-21 LAB
SARS-COV-2: NOT DETECTED
SOURCE: NORMAL

## 2021-04-27 LAB — TSH SERPL DL<=0.05 MIU/L-ACNC: <0.01 UIU/ML (ref 0.27–4.2)

## 2021-05-11 LAB — GLUCOSE FASTING: 85 MG/DL (ref 74–99)

## 2021-05-25 LAB — TSH SERPL DL<=0.05 MIU/L-ACNC: 0.02 UIU/ML (ref 0.27–4.2)

## 2021-06-08 LAB — GLUCOSE FASTING: 84 MG/DL (ref 74–99)

## 2021-06-19 ENCOUNTER — HOSPITAL ENCOUNTER (EMERGENCY)
Age: 76
Discharge: HOME | End: 2021-06-19
Attending: EMERGENCY MEDICINE
Payer: COMMERCIAL

## 2021-06-19 VITALS
HEIGHT: 66 IN | BODY MASS INDEX: 33.09 KG/M2 | TEMPERATURE: 97.1 F | OXYGEN SATURATION: 95 % | RESPIRATION RATE: 18 BRPM | SYSTOLIC BLOOD PRESSURE: 98 MMHG | HEART RATE: 108 BPM | DIASTOLIC BLOOD PRESSURE: 68 MMHG

## 2021-06-19 DIAGNOSIS — K92.2 GASTROINTESTINAL HEMORRHAGE, UNSPECIFIED GASTROINTESTINAL HEMORRHAGE TYPE: Primary | ICD-10-CM

## 2021-06-19 LAB
EKG ATRIAL RATE: 156 BPM
EKG Q-T INTERVAL: 330 MS
EKG QRS DURATION: 78 MS
EKG QTC CALCULATION (BAZETT): 483 MS
EKG R AXIS: 1 DEGREES
EKG T AXIS: 120 DEGREES
EKG VENTRICULAR RATE: 129 BPM

## 2021-06-19 PROCEDURE — 96375 TX/PRO/DX INJ NEW DRUG ADDON: CPT

## 2021-06-19 PROCEDURE — 93010 ELECTROCARDIOGRAM REPORT: CPT | Performed by: INTERNAL MEDICINE

## 2021-06-19 PROCEDURE — 2580000003 HC RX 258: Performed by: FAMILY MEDICINE

## 2021-06-19 PROCEDURE — 2500000003 HC RX 250 WO HCPCS: Performed by: FAMILY MEDICINE

## 2021-06-19 PROCEDURE — 93005 ELECTROCARDIOGRAM TRACING: CPT | Performed by: FAMILY MEDICINE

## 2021-06-19 PROCEDURE — 96374 THER/PROPH/DIAG INJ IV PUSH: CPT

## 2021-06-19 PROCEDURE — 99285 EMERGENCY DEPT VISIT HI MDM: CPT

## 2021-06-19 PROCEDURE — 6360000002 HC RX W HCPCS: Performed by: FAMILY MEDICINE

## 2021-06-19 RX ORDER — FAMOTIDINE 20 MG/1
20 TABLET, FILM COATED ORAL 2 TIMES DAILY
Qty: 60 TABLET | Refills: 0 | Status: SHIPPED | OUTPATIENT
Start: 2021-06-19

## 2021-06-19 RX ORDER — FENTANYL CITRATE 50 UG/ML
25 INJECTION, SOLUTION INTRAMUSCULAR; INTRAVENOUS ONCE
Status: COMPLETED | OUTPATIENT
Start: 2021-06-19 | End: 2021-06-19

## 2021-06-19 RX ORDER — ONDANSETRON 2 MG/ML
4 INJECTION INTRAMUSCULAR; INTRAVENOUS ONCE
Status: COMPLETED | OUTPATIENT
Start: 2021-06-19 | End: 2021-06-19

## 2021-06-19 RX ORDER — SODIUM CHLORIDE 9 MG/ML
INJECTION, SOLUTION INTRAVENOUS CONTINUOUS
Status: DISCONTINUED | OUTPATIENT
Start: 2021-06-19 | End: 2021-06-19 | Stop reason: HOSPADM

## 2021-06-19 RX ADMIN — FAMOTIDINE 20 MG: 10 INJECTION INTRAVENOUS at 08:59

## 2021-06-19 RX ADMIN — SODIUM CHLORIDE: 9 INJECTION, SOLUTION INTRAVENOUS at 08:23

## 2021-06-19 RX ADMIN — ONDANSETRON 4 MG: 2 INJECTION INTRAMUSCULAR; INTRAVENOUS at 09:01

## 2021-06-19 RX ADMIN — FENTANYL CITRATE 25 MCG: 0.05 INJECTION, SOLUTION INTRAMUSCULAR; INTRAVENOUS at 09:03

## 2021-06-19 ASSESSMENT — PAIN SCALES - GENERAL: PAINLEVEL_OUTOF10: 0

## 2021-06-19 NOTE — ED NOTES
Spoke with margot from [facility] and stated they will be here to transport pt at approximately 1030.       Edilberto Montgomery RN  06/19/21 2475

## 2021-06-19 NOTE — PROGRESS NOTES
[facility] [place]     Liaison Information Visit Note              Patient Name: Malika Rodríguez   :  1945  MRN:  [id number]  Admit date:  2021   Hospital Admitting Physician:  No admitting provider for patient encounter. PCP:  Eri Islas DO  Primary Insurance: Payor: [facility] /  /  /    Emergency Contact:      Contact/Relation:   /         Phone:     Advance Directive  Patient has a documented healthcare surrogate  Discussed with: Other Caregiver  DPOA-HC Name-Relation:    Phone:     Terminal Diagnosis vascular dementia as confirmed by Dr. Corry Enriquez Problem List:   Patient Active Problem List   Diagnosis Code    CKD (chronic kidney disease) stage 3, GFR 30-59 ml/min ([facility]) N18.30    Essential hypertension I10    DM (diabetes mellitus) type II controlled with renal manifestation ([facility] [address] E11.29    Insomnia, unspecified G47.00    Generalized osteoarthrosis, involving multiple sites M15.9    Obstructive chronic bronchitis without exacerbation ([facility] [address] J44.9    Personal history of fall Z91.81    Vascular dementia, uncomplicated ([facility] [address] F86.53    Other constipation K59.09    Difficulty walking R26.2    Agitation R45.1    Anxiety F41.9    Obesity (BMI 30.0-34. 9) E66.9    COVID-19 U07.1       Code Status Order: DNR-CC     Allergies:  Nicotine polacrilex    Family Goal: comfort    Meeting held with spoke with on call guardian Carey Ramirez    The hospice benefit and philosophy were explained including that hospice is end of life care in which, per Medicare, a patient has a terminal diagnosis that life expectancy would be 6 months or less. Hospice care is a service that is covered by most insurance plans.   The following levels of hospice care were discussed including, routine level of hospice care at private home or facility, which patient/family is responsible for any room and board fees at the facility, and general in patient level of care (GIP) at the I explained that the guardian was on board for hospice services but would be Monday before they can get consents signed therefore [facility] could not admit until at least Monday. [facility] intake will follow up on Monday. Discharge Plan:  Discharge Disposition; [facility]    [facility] plan:  1.  patient returning to [facility] and will be admitted to hospice at the facility. 2. Please call [facility] [phone] with any questions. 3. Patient not currently under the care of hospice.     Electronically signed by Diego Lockett RN on 6/19/2021 at 10:10 AM

## 2021-06-19 NOTE — ED PROVIDER NOTES
---------------------------   The nursing notes within the ED encounter and vital signs as below have been reviewed. BP 98/68   Pulse 108   Temp 97.1 °F (36.2 °C) (Temporal)   Resp 18   Ht 5' 6\" (1.676 m)   SpO2 95%   BMI 33.09 kg/m²   Oxygen Saturation Interpretation: Normal      ---------------------------------------------------PHYSICAL EXAM--------------------------------------      Constitutional/General: Alert and disoriented, aphasic at baseline  Head: Normocephalic and atraumatic  Eyes: PERRL, EOMI  Pulmonary: Lungs clear to auscultation bilaterally, no wheezes, rales, or rhonchi. Not in respiratory distress  Cardiovascular:  Tachycardic, no murmurs, gallops, or rubs. 2+ distal pulses  Abdomen: Generalized tenderness. Soft, non distended,   Extremities: Moves all extremities x 4. Warm and well perfused  Skin: warm and dry without rash  Neurologic: GCS 12  Psych: Blunted Affect      ------------------------------ ED COURSE/MEDICAL DECISION MAKING----------------------  Medications   0.9 % sodium chloride infusion ( Intravenous New Bag 6/19/21 0823)   fentaNYL (SUBLIMAZE) injection 25 mcg (25 mcg Intravenous Given 6/19/21 0903)   ondansetron (ZOFRAN) injection 4 mg (4 mg Intravenous Given 6/19/21 0901)   famotidine (PEPCID) injection 20 mg (20 mg Intravenous Given 6/19/21 0859)         ED COURSE:  ED Course as of Jun 19 0912   Sat Jun 19, 2021   0730 EKG: This EKG is signed and interpreted by me. 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  Discussion with legal guardian Abrahandevora Tessie and plan for hospice as patient has already been declared DNR-CC in previous encounters and pursuing surgical life-sustaining interventions are not in line with patient's goals of care. Patient treated for symptoms with saline, zofran, pepcid, and fentanyl. For transfer back to [facility] with hospice on board. Counseling: The emergency provider has spoken with the patient and legal guardian and discussed todays results, in addition to providing specific details for the plan of care and counseling regarding the diagnosis and prognosis. Questions are answered at this time and they are agreeable with the plan.      --------------------------------- IMPRESSION AND DISPOSITION ---------------------------------    IMPRESSION  1. Gastrointestinal hemorrhage, unspecified gastrointestinal hemorrhage type        DISPOSITION  Disposition: Discharge to nursing home  Patient condition is serious      NOTE: This report was transcribed using voice recognition software.  Every effort was made to ensure accuracy; however, inadvertent computerized transcription errors may be present       Jorge Luis Harris MD  Resident  06/19/21 8935

## 2021-06-19 NOTE — ED NOTES
Report called to dwain at [facility] and aware dr lyons awaiting call back from hospice=dr. lyons stated he will direct hospice to [facility] when they call back.       Steve Butler RN  06/19/21 8969